# Patient Record
Sex: MALE | Race: OTHER
[De-identification: names, ages, dates, MRNs, and addresses within clinical notes are randomized per-mention and may not be internally consistent; named-entity substitution may affect disease eponyms.]

---

## 2018-08-19 ENCOUNTER — TRANSCRIPTION ENCOUNTER (OUTPATIENT)
Age: 33
End: 2018-08-19

## 2018-08-20 ENCOUNTER — INPATIENT (INPATIENT)
Facility: HOSPITAL | Age: 33
LOS: 8 days | Discharge: HOME | DRG: 489 | End: 2018-08-29
Attending: ORTHOPAEDIC SURGERY | Admitting: ORTHOPAEDIC SURGERY
Payer: COMMERCIAL

## 2018-08-20 VITALS
WEIGHT: 160.06 LBS | HEIGHT: 66 IN | SYSTOLIC BLOOD PRESSURE: 128 MMHG | TEMPERATURE: 98 F | DIASTOLIC BLOOD PRESSURE: 82 MMHG | HEART RATE: 65 BPM | RESPIRATION RATE: 18 BRPM | OXYGEN SATURATION: 96 %

## 2018-08-20 DIAGNOSIS — M00.9 PYOGENIC ARTHRITIS, UNSPECIFIED: ICD-10-CM

## 2018-08-20 LAB
ANION GAP SERPL CALC-SCNC: 14 MMOL/L — SIGNIFICANT CHANGE UP (ref 5–17)
APTT BLD: 31.3 SEC — SIGNIFICANT CHANGE UP (ref 27.5–37.4)
B PERT IGG+IGM PNL SER: ABNORMAL
BLD GP AB SCN SERPL QL: NEGATIVE — SIGNIFICANT CHANGE UP
BUN SERPL-MCNC: 17 MG/DL — SIGNIFICANT CHANGE UP (ref 7–23)
CALCIUM SERPL-MCNC: 9.6 MG/DL — SIGNIFICANT CHANGE UP (ref 8.4–10.5)
CHLORIDE SERPL-SCNC: 100 MMOL/L — SIGNIFICANT CHANGE UP (ref 96–108)
CO2 SERPL-SCNC: 21 MMOL/L — LOW (ref 22–31)
COLOR FLD: YELLOW — SIGNIFICANT CHANGE UP
CREAT SERPL-MCNC: 0.93 MG/DL — SIGNIFICANT CHANGE UP (ref 0.5–1.3)
EOSINOPHIL # FLD: 8 % — SIGNIFICANT CHANGE UP
FLUID INTAKE SUBSTANCE CLASS: SIGNIFICANT CHANGE UP
FLUID SEGMENTED GRANULOCYTES: 79 % — SIGNIFICANT CHANGE UP
GLUCOSE FLD-MCNC: <6 MG/DL — SIGNIFICANT CHANGE UP
GLUCOSE SERPL-MCNC: 122 MG/DL — HIGH (ref 70–99)
GRAM STN FLD: SIGNIFICANT CHANGE UP
HCT VFR BLD CALC: 44.4 % — SIGNIFICANT CHANGE UP (ref 39–50)
HGB BLD-MCNC: 15.8 G/DL — SIGNIFICANT CHANGE UP (ref 13–17)
INR BLD: 1 RATIO — SIGNIFICANT CHANGE UP (ref 0.88–1.16)
LYMPHOCYTES # FLD: 2 % — SIGNIFICANT CHANGE UP
MCHC RBC-ENTMCNC: 30.4 PG — SIGNIFICANT CHANGE UP (ref 27–34)
MCHC RBC-ENTMCNC: 35.6 GM/DL — SIGNIFICANT CHANGE UP (ref 32–36)
MCV RBC AUTO: 85.5 FL — SIGNIFICANT CHANGE UP (ref 80–100)
MONOS+MACROS # FLD: 11 % — SIGNIFICANT CHANGE UP
PLATELET # BLD AUTO: 174 K/UL — SIGNIFICANT CHANGE UP (ref 150–400)
POTASSIUM SERPL-MCNC: 3.9 MMOL/L — SIGNIFICANT CHANGE UP (ref 3.5–5.3)
POTASSIUM SERPL-SCNC: 3.9 MMOL/L — SIGNIFICANT CHANGE UP (ref 3.5–5.3)
PROT FLD-MCNC: 4.5 G/DL — SIGNIFICANT CHANGE UP
PROTHROM AB SERPL-ACNC: 10.8 SEC — SIGNIFICANT CHANGE UP (ref 9.8–12.7)
RBC # BLD: 5.2 M/UL — SIGNIFICANT CHANGE UP (ref 4.2–5.8)
RBC # FLD: 11.8 % — SIGNIFICANT CHANGE UP (ref 10.3–14.5)
RCV VOL RI: 50 /UL — HIGH (ref 0–5)
RH IG SCN BLD-IMP: POSITIVE — SIGNIFICANT CHANGE UP
RH IG SCN BLD-IMP: POSITIVE — SIGNIFICANT CHANGE UP
SODIUM SERPL-SCNC: 135 MMOL/L — SIGNIFICANT CHANGE UP (ref 135–145)
SPECIMEN SOURCE: SIGNIFICANT CHANGE UP
TOTAL NUCLEATED CELL COUNT, BODY FLUID: HIGH /UL (ref 0–5)
TUBE TYPE: SIGNIFICANT CHANGE UP
WBC # BLD: 13.1 K/UL — HIGH (ref 3.8–10.5)
WBC # FLD AUTO: 13.1 K/UL — HIGH (ref 3.8–10.5)

## 2018-08-20 PROCEDURE — 99285 EMERGENCY DEPT VISIT HI MDM: CPT | Mod: 25

## 2018-08-20 PROCEDURE — 20610 DRAIN/INJ JOINT/BURSA W/O US: CPT

## 2018-08-20 PROCEDURE — 27310 EXPLORATION OF KNEE JOINT: CPT | Mod: RT

## 2018-08-20 PROCEDURE — 71045 X-RAY EXAM CHEST 1 VIEW: CPT | Mod: 26

## 2018-08-20 PROCEDURE — 73562 X-RAY EXAM OF KNEE 3: CPT | Mod: 26,RT

## 2018-08-20 RX ORDER — OXYCODONE HYDROCHLORIDE 5 MG/1
10 TABLET ORAL
Qty: 0 | Refills: 0 | Status: DISCONTINUED | OUTPATIENT
Start: 2018-08-20 | End: 2018-08-21

## 2018-08-20 RX ORDER — MORPHINE SULFATE 50 MG/1
2 CAPSULE, EXTENDED RELEASE ORAL EVERY 4 HOURS
Qty: 0 | Refills: 0 | Status: DISCONTINUED | OUTPATIENT
Start: 2018-08-20 | End: 2018-08-21

## 2018-08-20 RX ORDER — DOCUSATE SODIUM 100 MG
100 CAPSULE ORAL THREE TIMES A DAY
Qty: 0 | Refills: 0 | Status: DISCONTINUED | OUTPATIENT
Start: 2018-08-20 | End: 2018-08-29

## 2018-08-20 RX ORDER — ONDANSETRON 8 MG/1
4 TABLET, FILM COATED ORAL ONCE
Qty: 0 | Refills: 0 | Status: DISCONTINUED | OUTPATIENT
Start: 2018-08-20 | End: 2018-08-20

## 2018-08-20 RX ORDER — TRAMADOL HYDROCHLORIDE 50 MG/1
50 TABLET ORAL EVERY 6 HOURS
Qty: 0 | Refills: 0 | Status: DISCONTINUED | OUTPATIENT
Start: 2018-08-20 | End: 2018-08-27

## 2018-08-20 RX ORDER — CEFAZOLIN SODIUM 1 G
1000 VIAL (EA) INJECTION ONCE
Qty: 0 | Refills: 0 | Status: COMPLETED | OUTPATIENT
Start: 2018-08-20 | End: 2018-08-20

## 2018-08-20 RX ORDER — SODIUM CHLORIDE 9 MG/ML
1000 INJECTION, SOLUTION INTRAVENOUS
Qty: 0 | Refills: 0 | Status: DISCONTINUED | OUTPATIENT
Start: 2018-08-20 | End: 2018-08-20

## 2018-08-20 RX ORDER — ONDANSETRON 8 MG/1
4 TABLET, FILM COATED ORAL EVERY 6 HOURS
Qty: 0 | Refills: 0 | Status: DISCONTINUED | OUTPATIENT
Start: 2018-08-20 | End: 2018-08-29

## 2018-08-20 RX ORDER — VANCOMYCIN HCL 1 G
1000 VIAL (EA) INTRAVENOUS DAILY
Qty: 0 | Refills: 0 | Status: DISCONTINUED | OUTPATIENT
Start: 2018-08-21 | End: 2018-08-21

## 2018-08-20 RX ORDER — OXYCODONE HYDROCHLORIDE 5 MG/1
10 TABLET ORAL EVERY 4 HOURS
Qty: 0 | Refills: 0 | Status: DISCONTINUED | OUTPATIENT
Start: 2018-08-20 | End: 2018-08-20

## 2018-08-20 RX ORDER — PANTOPRAZOLE SODIUM 20 MG/1
40 TABLET, DELAYED RELEASE ORAL DAILY
Qty: 0 | Refills: 0 | Status: DISCONTINUED | OUTPATIENT
Start: 2018-08-20 | End: 2018-08-29

## 2018-08-20 RX ORDER — ACETAMINOPHEN 500 MG
975 TABLET ORAL ONCE
Qty: 0 | Refills: 0 | Status: COMPLETED | OUTPATIENT
Start: 2018-08-20 | End: 2018-08-20

## 2018-08-20 RX ORDER — OXYCODONE HYDROCHLORIDE 5 MG/1
5 TABLET ORAL EVERY 4 HOURS
Qty: 0 | Refills: 0 | Status: DISCONTINUED | OUTPATIENT
Start: 2018-08-20 | End: 2018-08-20

## 2018-08-20 RX ORDER — TRAMADOL HYDROCHLORIDE 50 MG/1
50 TABLET ORAL EVERY 8 HOURS
Qty: 0 | Refills: 0 | Status: DISCONTINUED | OUTPATIENT
Start: 2018-08-20 | End: 2018-08-20

## 2018-08-20 RX ORDER — HYDROMORPHONE HYDROCHLORIDE 2 MG/ML
0.5 INJECTION INTRAMUSCULAR; INTRAVENOUS; SUBCUTANEOUS
Qty: 0 | Refills: 0 | Status: DISCONTINUED | OUTPATIENT
Start: 2018-08-20 | End: 2018-08-20

## 2018-08-20 RX ORDER — OXYCODONE HYDROCHLORIDE 5 MG/1
5 TABLET ORAL
Qty: 0 | Refills: 0 | Status: DISCONTINUED | OUTPATIENT
Start: 2018-08-20 | End: 2018-08-21

## 2018-08-20 RX ORDER — ASPIRIN/CALCIUM CARB/MAGNESIUM 324 MG
325 TABLET ORAL DAILY
Qty: 0 | Refills: 0 | Status: DISCONTINUED | OUTPATIENT
Start: 2018-08-20 | End: 2018-08-29

## 2018-08-20 RX ORDER — ONDANSETRON 8 MG/1
4 TABLET, FILM COATED ORAL EVERY 6 HOURS
Qty: 0 | Refills: 0 | Status: DISCONTINUED | OUTPATIENT
Start: 2018-08-20 | End: 2018-08-20

## 2018-08-20 RX ORDER — ACETAMINOPHEN 500 MG
1000 TABLET ORAL EVERY 8 HOURS
Qty: 0 | Refills: 0 | Status: DISCONTINUED | OUTPATIENT
Start: 2018-08-20 | End: 2018-08-29

## 2018-08-20 RX ORDER — SENNA PLUS 8.6 MG/1
2 TABLET ORAL AT BEDTIME
Qty: 0 | Refills: 0 | Status: DISCONTINUED | OUTPATIENT
Start: 2018-08-20 | End: 2018-08-29

## 2018-08-20 RX ORDER — POLYETHYLENE GLYCOL 3350 17 G/17G
17 POWDER, FOR SOLUTION ORAL DAILY
Qty: 0 | Refills: 0 | Status: DISCONTINUED | OUTPATIENT
Start: 2018-08-20 | End: 2018-08-29

## 2018-08-20 RX ORDER — MAGNESIUM HYDROXIDE 400 MG/1
30 TABLET, CHEWABLE ORAL DAILY
Qty: 0 | Refills: 0 | Status: DISCONTINUED | OUTPATIENT
Start: 2018-08-20 | End: 2018-08-29

## 2018-08-20 RX ORDER — MORPHINE SULFATE 50 MG/1
2 CAPSULE, EXTENDED RELEASE ORAL EVERY 4 HOURS
Qty: 0 | Refills: 0 | Status: DISCONTINUED | OUTPATIENT
Start: 2018-08-20 | End: 2018-08-20

## 2018-08-20 RX ORDER — KETOROLAC TROMETHAMINE 30 MG/ML
15 SYRINGE (ML) INJECTION EVERY 6 HOURS
Qty: 0 | Refills: 0 | Status: DISCONTINUED | OUTPATIENT
Start: 2018-08-20 | End: 2018-08-21

## 2018-08-20 RX ORDER — DOCUSATE SODIUM 100 MG
100 CAPSULE ORAL THREE TIMES A DAY
Qty: 0 | Refills: 0 | Status: DISCONTINUED | OUTPATIENT
Start: 2018-08-20 | End: 2018-08-20

## 2018-08-20 RX ORDER — ACETAMINOPHEN 500 MG
1000 TABLET ORAL EVERY 8 HOURS
Qty: 0 | Refills: 0 | Status: COMPLETED | OUTPATIENT
Start: 2018-08-20 | End: 2018-08-20

## 2018-08-20 RX ORDER — ACETAMINOPHEN 500 MG
650 TABLET ORAL EVERY 6 HOURS
Qty: 0 | Refills: 0 | Status: DISCONTINUED | OUTPATIENT
Start: 2018-08-20 | End: 2018-08-20

## 2018-08-20 RX ORDER — ACETAMINOPHEN 500 MG
650 TABLET ORAL EVERY 6 HOURS
Qty: 0 | Refills: 0 | Status: DISCONTINUED | OUTPATIENT
Start: 2018-08-20 | End: 2018-08-29

## 2018-08-20 RX ORDER — SODIUM CHLORIDE 9 MG/ML
1000 INJECTION, SOLUTION INTRAVENOUS
Qty: 0 | Refills: 0 | Status: DISCONTINUED | OUTPATIENT
Start: 2018-08-20 | End: 2018-08-29

## 2018-08-20 RX ADMIN — OXYCODONE HYDROCHLORIDE 5 MILLIGRAM(S): 5 TABLET ORAL at 16:31

## 2018-08-20 RX ADMIN — HYDROMORPHONE HYDROCHLORIDE 0.5 MILLIGRAM(S): 2 INJECTION INTRAMUSCULAR; INTRAVENOUS; SUBCUTANEOUS at 21:45

## 2018-08-20 RX ADMIN — OXYCODONE HYDROCHLORIDE 5 MILLIGRAM(S): 5 TABLET ORAL at 15:31

## 2018-08-20 RX ADMIN — HYDROMORPHONE HYDROCHLORIDE 0.5 MILLIGRAM(S): 2 INJECTION INTRAMUSCULAR; INTRAVENOUS; SUBCUTANEOUS at 22:40

## 2018-08-20 RX ADMIN — HYDROMORPHONE HYDROCHLORIDE 0.5 MILLIGRAM(S): 2 INJECTION INTRAMUSCULAR; INTRAVENOUS; SUBCUTANEOUS at 22:25

## 2018-08-20 RX ADMIN — Medication 975 MILLIGRAM(S): at 06:09

## 2018-08-20 RX ADMIN — Medication 15 MILLIGRAM(S): at 22:58

## 2018-08-20 RX ADMIN — SODIUM CHLORIDE 100 MILLILITER(S): 9 INJECTION, SOLUTION INTRAVENOUS at 22:58

## 2018-08-20 RX ADMIN — Medication 100 MILLIGRAM(S): at 07:59

## 2018-08-20 RX ADMIN — Medication 400 MILLIGRAM(S): at 23:00

## 2018-08-20 RX ADMIN — Medication 15 MILLIGRAM(S): at 23:17

## 2018-08-20 RX ADMIN — Medication 1000 MILLIGRAM(S): at 23:20

## 2018-08-20 RX ADMIN — Medication 1 TABLET(S): at 15:31

## 2018-08-20 RX ADMIN — HYDROMORPHONE HYDROCHLORIDE 0.5 MILLIGRAM(S): 2 INJECTION INTRAMUSCULAR; INTRAVENOUS; SUBCUTANEOUS at 22:10

## 2018-08-20 RX ADMIN — HYDROMORPHONE HYDROCHLORIDE 0.5 MILLIGRAM(S): 2 INJECTION INTRAMUSCULAR; INTRAVENOUS; SUBCUTANEOUS at 22:00

## 2018-08-20 RX ADMIN — Medication 975 MILLIGRAM(S): at 08:00

## 2018-08-20 NOTE — ED PROVIDER NOTE - NS ED ROS FT
Constitutional: no fever  Eyes: no conjunctivitis  Ears: no ear pain   Nose: no nasal congestion, Mouth/Throat: no throat pain, Neck: no stiffness  Cardiovascular: no chest pain  Chest: no cough  Gastrointestinal: no abdominal pain, no vomiting and diarrhea  MSK: + joint pain  : no dysuria  Skin: no rash  Neuro: no LOC

## 2018-08-20 NOTE — ED PROVIDER NOTE - PHYSICAL EXAMINATION
right leg: tenderness over superior anterior aspect of right knee with significant swelling appreciated, minimal range of motion secondary to pain, no erythema, dp pulse palpable, sensation and motor function intact

## 2018-08-20 NOTE — ED ADULT NURSE REASSESSMENT NOTE - NS ED NURSE REASSESS COMMENT FT1
Report received from NETTIE JAMES in red. Patient appears to be resting comfortably in stretcher. Patient denies dizziness, n/v/d, numbness, tingling, SOB, pain. A&OX3. Safety and comfort measures provided.

## 2018-08-20 NOTE — ED PROVIDER NOTE - ATTENDING CONTRIBUTION TO CARE
32y M no signif PMH here with c/o atraumatic R knee pain and swelling. Pt denies trauma or injury, States works as . Does not spend signif amount of time on hands and knees. States that this evening around 11PM noted his R knee was swollen and painful to touch mostly in suprapatellar region. Pain is worse with knee flexion, palpation and ambulation. Swelling has increased since onset. There is no redness or fever. No hx of DM.   On exam: Pt is well appearing in NAD, Periph pulses intact, sensation intact, no rashes or lesions, R knee swelling suprapatellar region and medial infrapatellar region.   AP: 32y M no signif PMH here with c/o atraumatic R knee pain and swelling. Xray. Do not suspect septic joint. More likely bursitis. Will tap joint for pain control. Ace wrap, analgesia, FU sports clinic. 32y M no signif PMH here with c/o atraumatic R knee pain and swelling. Pt denies trauma or injury, States works as . Does not spend signif amount of time on hands and knees. States that this evening around 11PM noted his R knee was swollen and painful to touch mostly in suprapatellar region. Pain is worse with knee flexion, palpation and ambulation. Swelling has increased since onset. There is no redness or fever. No hx of DM.   On exam: Pt is well appearing in NAD, Periph pulses intact, sensation intact, no rashes or lesions, R knee swelling suprapatellar region and medial infrapatellar region. R knee Slightly warm to touch.   AP: 32y M no signif PMH here with c/o atraumatic R knee pain and swelling. Xray. Do not suspect septic joint. More likely bursitis. Will tap joint for pain control and send fluid. 32y M no signif PMH here with c/o atraumatic R knee pain and swelling. Pt denies trauma or injury, States works as . Does not spend signif amount of time on hands and knees. States that this evening around 11PM noted his R knee was swollen and painful to touch mostly in suprapatellar region. Pain is worse with knee flexion, palpation and ambulation. Swelling has increased since onset. There is no redness or fever. No hx of DM.   On exam: Pt is well appearing in NAD, Periph pulses intact, sensation intact, R knee swelling suprapatellar region and medial infrapatellar region. R knee Slightly warm to touch. Dec R knee ROM 2/2 pain and swelling. Skin is intact without abrasions, rashes or lesions. No erythema to R knee.   AP: 32y M no signif PMH here with c/o atraumatic R knee pain and swelling. Xray. Do not suspect septic joint, more likely bursitis. Will tap joint for pain control and send fluid.

## 2018-08-20 NOTE — H&P ADULT - HISTORY OF PRESENT ILLNESS
32M with R knee pain/swelling. Pt states he woke up last night with R knee pain/swelling. Denies any recent falls or trauma to area. Denies subjective fevers/chills, denies numbness/tingling. Pt states pain progressively worsened to the point he was unable to bear weight on RLE. ED staff aspirated R knee and state about 60cc of cloudy fluid was drained. Pt denies any history of gout/pseudogout and has not had any orthopedic surgeries in the past. No other orthopedic complaints. Denies any recent travel or illnesses.

## 2018-08-20 NOTE — H&P ADULT - ASSESSMENT
32M with R septic knee  Admit to ortho  Pain control  NWB RLE  NPO  IVF while NPO  FU BCx and aspiration cultures  Hold antibiotics until OR this afternoon  Plan for OR this afternoon with Dr. Ladd for I&D R knee  Discussed with attending

## 2018-08-20 NOTE — H&P ADULT - NSHPREVIEWOFSYSTEMS_GEN_ALL_CORE
CONSTITUTIONAL: No fever or chills  HEENT:  No headache, no sore throat  RESPIRATORY: No cough, wheezing, or shortness of breath  CARDIOVASCULAR: No chest pain, palpitations, or leg swelling  GASTROINTESTINAL: No nausea, vomiting, or diarrhea  GENITOURINARY: No dysuria, frequency, or hematuria  NEUROLOGICAL: no focal weakness or dizziness  SKIN:  No rashes or lesions   MUSCULOSKELETAL: no myalgias, +R knee pain/swelling  PSYCHIATRIC: No depression or anxiety

## 2018-08-20 NOTE — H&P ADULT - ATTENDING COMMENTS
R septic knee with >70,000, 79% PMNs. Plan for urgent I+D. All RBAs discussed. All questions answered. Informed consent obtained.

## 2018-08-20 NOTE — CHART NOTE - NSCHARTNOTEFT_GEN_A_CORE
C/o dsg "too tight around the knee". Denies chest pain, SOB, N/V.    T(C): 36.2 (08-20-18 @ 21:20)  T(F): 97.2 (08-20-18 @ 21:20)  HR: 66 (08-20-18 @ 22:30)  BP: 149/85 (08-20-18 @ 22:15)  RR: 18 (08-20-18 @ 22:10)  SpO2: 100% (08-20-18 @ 22:30)      Exam:  Alert and oriented; No acute distress    R lower extremity:  Knee dsg CDI; re-wrapped ace  HMV intact with good suction   Calf soft, non-tender  +PF/DF/EHL/FHL  Sensation grossly intact                            15.8   13.1  )-----------( 174      ( 20 Aug 2018 06:26 )             44.4        135  |  100  |  17  ----------------------------<  122<H>  3.9   |  21<L>  |  0.93      A/P: 32y Male s/p R knee I&D; Stable    -Pain control, ice prn, elevate  -DVT ppx; IS  -Am labs  -PT eval-WBAT RLE  -Continue current tx.    Evangelina Clement PA-C  Orthopedic Surgery  Pagers [phone]

## 2018-08-20 NOTE — ED ADULT NURSE NOTE - OBJECTIVE STATEMENT
31 yo presents to the ED from home. A&Ox4 c/o R knee swelling and pain. pt reports pain started int he middle of the night and it has worsened since then. pt denies any trauma to site recently. pt denies fever, chills. pt denies similar episode in the past. pt reports that 1 year ago he had wood fall onto the same knee but did not experience much pain at that time. pt reports taking Motrin 3 hours ago for pain control but only experienced minimal relief. pt denies numbness, tingling. pt reports being able to ambulate but with pain. pt denies medical history. pt is well appearing, VSS. family at bedside.

## 2018-08-20 NOTE — ED PROVIDER NOTE - PROGRESS NOTE DETAILS
Dr. Felton: Fluid was cloudy. Sent for eval. Will cover w ABx. rob - pt endorsed to me at signout pending arthrocentesis--ortho awAre-- arthrocentesis shows >70,000 wbc - Dr. Felton: Joint fluid was cloudy. Sent for eval. Will cover w ABx. Ortho contacted.

## 2018-08-20 NOTE — H&P ADULT - NSHPPHYSICALEXAM_GEN_ALL_CORE
Gen: NAD, AAOx3  RLE: Skin intact, +mild warmth R knee, no erythema, minimal effusion (evaluated after ED aspiration), +EHL/FHL/TA/GS, SILT, +dp pulse intact, compartments soft/compressible, AROM knee 0-90 degrees with pain, no varus/valgus instability noted

## 2018-08-20 NOTE — BRIEF OPERATIVE NOTE - PROCEDURE
<<-----Click on this checkbox to enter Procedure Irrigation and debridement of knee  08/20/2018  Right, open  Active  AVJOB

## 2018-08-20 NOTE — ED PROVIDER NOTE - OBJECTIVE STATEMENT
31 yo male presenting with right knee pain that started at 11 pm progressively getting worse, worse with movement, somewhat improved with rest.  took ibuprofen for his symptoms with minimal relief.  pain radiates down leg.  no recent trauma.  pain 9/10 in severity with movement.  has never happened in the past.  no fevers.

## 2018-08-21 LAB
ANION GAP SERPL CALC-SCNC: 10 MMOL/L — SIGNIFICANT CHANGE UP (ref 5–17)
B BURGDOR C6 AB SER-ACNC: NEGATIVE — SIGNIFICANT CHANGE UP
B BURGDOR IGG+IGM SER-ACNC: 0.31 INDEX — SIGNIFICANT CHANGE UP (ref 0.01–0.89)
BUN SERPL-MCNC: 10 MG/DL — SIGNIFICANT CHANGE UP (ref 7–23)
CALCIUM SERPL-MCNC: 8.4 MG/DL — SIGNIFICANT CHANGE UP (ref 8.4–10.5)
CHLORIDE SERPL-SCNC: 102 MMOL/L — SIGNIFICANT CHANGE UP (ref 96–108)
CO2 SERPL-SCNC: 27 MMOL/L — SIGNIFICANT CHANGE UP (ref 22–31)
CREAT SERPL-MCNC: 1.02 MG/DL — SIGNIFICANT CHANGE UP (ref 0.5–1.3)
ERYTHROCYTE [SEDIMENTATION RATE] IN BLOOD: 18 MM/HR — HIGH (ref 0–15)
GLUCOSE SERPL-MCNC: 128 MG/DL — HIGH (ref 70–99)
GRAM STN FLD: SIGNIFICANT CHANGE UP
HCT VFR BLD CALC: 39.1 % — SIGNIFICANT CHANGE UP (ref 39–50)
HGB BLD-MCNC: 13.2 G/DL — SIGNIFICANT CHANGE UP (ref 13–17)
LYME C6 AB IGG/IGM EIA REFLEX WESTERN BL: SIGNIFICANT CHANGE UP
MCHC RBC-ENTMCNC: 28.9 PG — SIGNIFICANT CHANGE UP (ref 27–34)
MCHC RBC-ENTMCNC: 33.8 GM/DL — SIGNIFICANT CHANGE UP (ref 32–36)
MCV RBC AUTO: 85.7 FL — SIGNIFICANT CHANGE UP (ref 80–100)
PLATELET # BLD AUTO: 174 K/UL — SIGNIFICANT CHANGE UP (ref 150–400)
POTASSIUM SERPL-MCNC: 3.9 MMOL/L — SIGNIFICANT CHANGE UP (ref 3.5–5.3)
POTASSIUM SERPL-SCNC: 3.9 MMOL/L — SIGNIFICANT CHANGE UP (ref 3.5–5.3)
RBC # BLD: 4.56 M/UL — SIGNIFICANT CHANGE UP (ref 4.2–5.8)
RBC # FLD: 13.8 % — SIGNIFICANT CHANGE UP (ref 10.3–14.5)
SODIUM SERPL-SCNC: 139 MMOL/L — SIGNIFICANT CHANGE UP (ref 135–145)
SPECIMEN SOURCE: SIGNIFICANT CHANGE UP
WBC # BLD: 7.08 K/UL — SIGNIFICANT CHANGE UP (ref 3.8–10.5)
WBC # FLD AUTO: 7.08 K/UL — SIGNIFICANT CHANGE UP (ref 3.8–10.5)

## 2018-08-21 RX ORDER — ACETAMINOPHEN 500 MG
1000 TABLET ORAL EVERY 8 HOURS
Qty: 0 | Refills: 0 | Status: COMPLETED | OUTPATIENT
Start: 2018-08-21 | End: 2018-08-21

## 2018-08-21 RX ORDER — CEFTRIAXONE 500 MG/1
2 INJECTION, POWDER, FOR SOLUTION INTRAMUSCULAR; INTRAVENOUS EVERY 24 HOURS
Qty: 0 | Refills: 0 | Status: DISCONTINUED | OUTPATIENT
Start: 2018-08-21 | End: 2018-08-29

## 2018-08-21 RX ORDER — VANCOMYCIN HCL 1 G
1000 VIAL (EA) INTRAVENOUS EVERY 12 HOURS
Qty: 0 | Refills: 0 | Status: DISCONTINUED | OUTPATIENT
Start: 2018-08-21 | End: 2018-08-23

## 2018-08-21 RX ORDER — HYDROMORPHONE HYDROCHLORIDE 2 MG/ML
2 INJECTION INTRAMUSCULAR; INTRAVENOUS; SUBCUTANEOUS
Qty: 0 | Refills: 0 | Status: DISCONTINUED | OUTPATIENT
Start: 2018-08-21 | End: 2018-08-23

## 2018-08-21 RX ORDER — HYDROMORPHONE HYDROCHLORIDE 2 MG/ML
4 INJECTION INTRAMUSCULAR; INTRAVENOUS; SUBCUTANEOUS
Qty: 0 | Refills: 0 | Status: DISCONTINUED | OUTPATIENT
Start: 2018-08-21 | End: 2018-08-22

## 2018-08-21 RX ADMIN — Medication 100 MILLIGRAM(S): at 14:56

## 2018-08-21 RX ADMIN — POLYETHYLENE GLYCOL 3350 17 GRAM(S): 17 POWDER, FOR SOLUTION ORAL at 11:35

## 2018-08-21 RX ADMIN — OXYCODONE HYDROCHLORIDE 10 MILLIGRAM(S): 5 TABLET ORAL at 06:34

## 2018-08-21 RX ADMIN — Medication 1000 MILLIGRAM(S): at 09:12

## 2018-08-21 RX ADMIN — OXYCODONE HYDROCHLORIDE 10 MILLIGRAM(S): 5 TABLET ORAL at 06:04

## 2018-08-21 RX ADMIN — Medication 400 MILLIGRAM(S): at 09:52

## 2018-08-21 RX ADMIN — OXYCODONE HYDROCHLORIDE 10 MILLIGRAM(S): 5 TABLET ORAL at 00:37

## 2018-08-21 RX ADMIN — HYDROMORPHONE HYDROCHLORIDE 4 MILLIGRAM(S): 2 INJECTION INTRAMUSCULAR; INTRAVENOUS; SUBCUTANEOUS at 19:20

## 2018-08-21 RX ADMIN — HYDROMORPHONE HYDROCHLORIDE 4 MILLIGRAM(S): 2 INJECTION INTRAMUSCULAR; INTRAVENOUS; SUBCUTANEOUS at 23:31

## 2018-08-21 RX ADMIN — HYDROMORPHONE HYDROCHLORIDE 4 MILLIGRAM(S): 2 INJECTION INTRAMUSCULAR; INTRAVENOUS; SUBCUTANEOUS at 12:51

## 2018-08-21 RX ADMIN — HYDROMORPHONE HYDROCHLORIDE 4 MILLIGRAM(S): 2 INJECTION INTRAMUSCULAR; INTRAVENOUS; SUBCUTANEOUS at 18:51

## 2018-08-21 RX ADMIN — Medication 15 MILLIGRAM(S): at 14:56

## 2018-08-21 RX ADMIN — MORPHINE SULFATE 2 MILLIGRAM(S): 50 CAPSULE, EXTENDED RELEASE ORAL at 03:00

## 2018-08-21 RX ADMIN — PANTOPRAZOLE SODIUM 40 MILLIGRAM(S): 20 TABLET, DELAYED RELEASE ORAL at 11:35

## 2018-08-21 RX ADMIN — HYDROMORPHONE HYDROCHLORIDE 4 MILLIGRAM(S): 2 INJECTION INTRAMUSCULAR; INTRAVENOUS; SUBCUTANEOUS at 13:20

## 2018-08-21 RX ADMIN — MORPHINE SULFATE 2 MILLIGRAM(S): 50 CAPSULE, EXTENDED RELEASE ORAL at 08:33

## 2018-08-21 RX ADMIN — Medication 100 MILLIGRAM(S): at 21:14

## 2018-08-21 RX ADMIN — MORPHINE SULFATE 2 MILLIGRAM(S): 50 CAPSULE, EXTENDED RELEASE ORAL at 02:45

## 2018-08-21 RX ADMIN — Medication 325 MILLIGRAM(S): at 11:35

## 2018-08-21 RX ADMIN — Medication 15 MILLIGRAM(S): at 15:11

## 2018-08-21 RX ADMIN — Medication 250 MILLIGRAM(S): at 11:35

## 2018-08-21 RX ADMIN — CEFTRIAXONE 100 GRAM(S): 500 INJECTION, POWDER, FOR SOLUTION INTRAMUSCULAR; INTRAVENOUS at 18:12

## 2018-08-21 RX ADMIN — Medication 250 MILLIGRAM(S): at 22:04

## 2018-08-21 RX ADMIN — OXYCODONE HYDROCHLORIDE 10 MILLIGRAM(S): 5 TABLET ORAL at 00:07

## 2018-08-21 RX ADMIN — MORPHINE SULFATE 2 MILLIGRAM(S): 50 CAPSULE, EXTENDED RELEASE ORAL at 08:18

## 2018-08-21 NOTE — CONSULT NOTE ADULT - SUBJECTIVE AND OBJECTIVE BOX
HPI:   Patient is a 32y male with no past medical history and has been perfectly well until 2 nights ago when his right knee began to hurt and when he woke in the morning it was very swollen. He came here, had tap with 75K wbc, mostly pmn, had washout and we are called. He has no fever, no h/o std exposure known, no penile discharge, no recent illness or dental procedure, no diarrhea, no known tick bite but he is a  on [place]. He never had any other episodes of joint swelling. He has no rashes, no ill contact , no recent travel. From [facility] in  for 14 years. Monogamous with wife.     REVIEW OF SYSTEMS:  All other review of systems negative (Comprehensive ROS)    PAST MEDICAL & SURGICAL HISTORY:  No pertinent past medical history  No significant past surgical history      Allergies    No Known Allergies    Intolerances        Antimicrobials Day #  :1  vancomycin  IVPB 1000 milliGRAM(s) IV Intermittent daily    Other Medications:  acetaminophen   Tablet 650 milliGRAM(s) Oral every 6 hours PRN  acetaminophen  IVPB. 1000 milliGRAM(s) IV Intermittent every 8 hours PRN  aluminum hydroxide/magnesium hydroxide/simethicone Suspension 30 milliLiter(s) Oral four times a day PRN  aspirin enteric coated 325 milliGRAM(s) Oral daily  docusate sodium 100 milliGRAM(s) Oral three times a day  HYDROmorphone   Tablet 2 milliGRAM(s) Oral every 3 hours PRN  HYDROmorphone   Tablet 4 milliGRAM(s) Oral every 3 hours PRN  ketorolac   Injectable 15 milliGRAM(s) IV Push every 6 hours PRN  lactated ringers. 1000 milliLiter(s) IV Continuous <Continuous>  magnesium hydroxide Suspension 30 milliLiter(s) Oral daily PRN  morphine  - Injectable 2 milliGRAM(s) IV Push every 4 hours PRN  ondansetron Injectable 4 milliGRAM(s) IV Push every 6 hours PRN  pantoprazole    Tablet 40 milliGRAM(s) Oral daily  polyethylene glycol 3350 17 Gram(s) Oral daily  senna 2 Tablet(s) Oral at bedtime PRN  traMADol 50 milliGRAM(s) Oral every 6 hours PRN      FAMILY HISTORY:  No pertinent family history in first degree relatives      SOCIAL HISTORY:  Smoking: [ ]Yes [ x]No  ETOH: [ ]Yes [x ]No  Drug Use: [ ]Yes [x ]No   [ x] Single[ ]    T(F): 98.6 (08-21-18 @ 08:30), Max: 98.6 (08-21-18 @ 08:30)  HR: 72 (08-21-18 @ 08:30)  BP: 137/79 (08-21-18 @ 08:30)  RR: 18 (08-21-18 @ 08:30)  SpO2: 97% (08-21-18 @ 08:30)  Wt(kg): --    PHYSICAL EXAM:  General: alert, no acute distress  Eyes:  anicteric, no conjunctival injection, no discharge  Oropharynx: no lesions or injection 	  Neck: supple, without adenopathy  Lungs: clear to auscultation  Heart: regular rate and rhythm; S1S2 2/6 sys m  Abdomen: soft, nondistended, nontender, without mass or organomegaly  Skin: no lesions  Extremities: no clubbing, cyanosis, or edema  Neurologic: alert, oriented, moves all extremities    LAB RESULTS:                        13.2   7.08  )-----------( 174      ( 21 Aug 2018 08:36 )             39.1     08-21    139  |  102  |  10  ----------------------------<  128<H>  3.9   |  27  |  1.02    Ca    8.4      21 Aug 2018 06:17            MICROBIOLOGY:  RECENT CULTURES:  08-20 @ 08:23 .Body Fluid Synovial Fluid     No growth    Numerous polymorphonuclear leukocytes seen per low power field  No organisms seen per oil power field          RADIOLOGY REVIEWED:    < from: Xray Knee 3 Views, Right (08.20.18 @ 06:37) >  IMPRESSION:    No fracture or dislocation of the right knee. Small suprapatellar joint   effusion.      < end of copied text >    Impression:  Patient with presumed septic right knee for no apparent reason now s/p washout. No known bacteremia episode or reason for it, no symptoms of gc, must r/o lyme but fluid seems a bit purulent.     Recommendations:  cover with vanco and ceftriaxone  await cultures  check gc chlamydia urine  check lyme titers  may need rheum eval to r/o reactive arthritis but seems abrupt and no antecedent illness

## 2018-08-21 NOTE — PROGRESS NOTE ADULT - SUBJECTIVE AND OBJECTIVE BOX
Ortho Progress Note    S: Patient seen and examined. No acute events overnight, transferred to floor from PACU. Pain well controlled with current regimen. Denies lightheadedness/dizziness, CP/SOB. Tolerating diet.       O:  Physical Exam:  Gen: Laying in bed, NAD, alert and oriented.   Resp: Unlabored breathing  Ext: EHL/FHL/TA/Sol intact          + SILT DP/SP/GROVER/Sa          +DP, extremity WWP  Bulky Garza dressing in place  Hemovac in place, draining serosang fluid    Hemovac output 60cc / shift    Vital Signs Last 24 Hrs  T(C): 36.4 (21 Aug 2018 02:40), Max: 36.9 (20 Aug 2018 07:35)  T(F): 97.6 (21 Aug 2018 02:40), Max: 98.5 (20 Aug 2018 07:35)  HR: 64 (21 Aug 2018 02:40) (59 - 86)  BP: 127/70 (21 Aug 2018 02:40) (117/71 - 156/92)  BP(mean): 112 (20 Aug 2018 23:15) (104 - 113)  RR: 18 (21 Aug 2018 02:40) (16 - 20)  SpO2: 98% (21 Aug 2018 02:40) (97% - 100%)                          15.8   13.1  )-----------( 174      ( 20 Aug 2018 06:26 )             44.4       08-20    135  |  100  |  17  ----------------------------<  122<H>  3.9   |  21<L>  |  0.93        PT/INR - ( 20 Aug 2018 06:26 )   PT: 10.8 sec;   INR: 1.00 ratio         PTT - ( 20 Aug 2018 06:26 )  PTT:31.3 sec      A/P  32y Male w/ septic R knee s/p open knee washout POD 1.     -f/u AM labs  -Vancomycin  -ID consult for further Abx management  -bulky garza dressing change q 2 days  -hemovac until draining <20cc / q8hr shift  -f/u OR and Aspiration cultures  -WBAT  -DVT Ppx:  daily  -Diet: Reg  -PT

## 2018-08-21 NOTE — PHYSICAL THERAPY INITIAL EVALUATION ADULT - ACTIVE RANGE OF MOTION EXAMINATION, REHAB EVAL
Left LE Active ROM was WFL (within functional limits)/RLE limited 2/2 pain/bilateral upper extremity Active ROM was WFL (within functional limits)

## 2018-08-21 NOTE — PHYSICAL THERAPY INITIAL EVALUATION ADULT - RANGE OF MOTION EXAMINATION, REHAB EVAL
RLE limited 2/2 pain/bilateral upper extremity ROM was WFL (within functional limits)/Left LE ROM was WFL (within functional limits)

## 2018-08-21 NOTE — PHYSICAL THERAPY INITIAL EVALUATION ADULT - ADDITIONAL COMMENTS
Prior to admission pt was independent in all ADL's and ambulation without an AD. Pt works as a . Lives with wife and children in apartment with 1 step to enter, Rail on R going up. Then elevator access.

## 2018-08-22 LAB
C TRACH RRNA SPEC QL NAA+PROBE: SIGNIFICANT CHANGE UP
N GONORRHOEA RRNA SPEC QL NAA+PROBE: SIGNIFICANT CHANGE UP
SPECIMEN SOURCE: SIGNIFICANT CHANGE UP
VANCOMYCIN TROUGH SERPL-MCNC: 4.2 UG/ML — LOW (ref 10–20)

## 2018-08-22 RX ADMIN — HYDROMORPHONE HYDROCHLORIDE 4 MILLIGRAM(S): 2 INJECTION INTRAMUSCULAR; INTRAVENOUS; SUBCUTANEOUS at 17:50

## 2018-08-22 RX ADMIN — PANTOPRAZOLE SODIUM 40 MILLIGRAM(S): 20 TABLET, DELAYED RELEASE ORAL at 13:18

## 2018-08-22 RX ADMIN — HYDROMORPHONE HYDROCHLORIDE 4 MILLIGRAM(S): 2 INJECTION INTRAMUSCULAR; INTRAVENOUS; SUBCUTANEOUS at 13:18

## 2018-08-22 RX ADMIN — HYDROMORPHONE HYDROCHLORIDE 4 MILLIGRAM(S): 2 INJECTION INTRAMUSCULAR; INTRAVENOUS; SUBCUTANEOUS at 03:38

## 2018-08-22 RX ADMIN — HYDROMORPHONE HYDROCHLORIDE 4 MILLIGRAM(S): 2 INJECTION INTRAMUSCULAR; INTRAVENOUS; SUBCUTANEOUS at 03:18

## 2018-08-22 RX ADMIN — CEFTRIAXONE 100 GRAM(S): 500 INJECTION, POWDER, FOR SOLUTION INTRAMUSCULAR; INTRAVENOUS at 17:51

## 2018-08-22 RX ADMIN — Medication 325 MILLIGRAM(S): at 13:18

## 2018-08-22 RX ADMIN — HYDROMORPHONE HYDROCHLORIDE 4 MILLIGRAM(S): 2 INJECTION INTRAMUSCULAR; INTRAVENOUS; SUBCUTANEOUS at 18:20

## 2018-08-22 RX ADMIN — HYDROMORPHONE HYDROCHLORIDE 4 MILLIGRAM(S): 2 INJECTION INTRAMUSCULAR; INTRAVENOUS; SUBCUTANEOUS at 00:00

## 2018-08-22 RX ADMIN — POLYETHYLENE GLYCOL 3350 17 GRAM(S): 17 POWDER, FOR SOLUTION ORAL at 13:18

## 2018-08-22 RX ADMIN — HYDROMORPHONE HYDROCHLORIDE 4 MILLIGRAM(S): 2 INJECTION INTRAMUSCULAR; INTRAVENOUS; SUBCUTANEOUS at 13:45

## 2018-08-22 RX ADMIN — HYDROMORPHONE HYDROCHLORIDE 4 MILLIGRAM(S): 2 INJECTION INTRAMUSCULAR; INTRAVENOUS; SUBCUTANEOUS at 08:32

## 2018-08-22 RX ADMIN — Medication 100 MILLIGRAM(S): at 13:18

## 2018-08-22 RX ADMIN — Medication 250 MILLIGRAM(S): at 23:46

## 2018-08-22 RX ADMIN — HYDROMORPHONE HYDROCHLORIDE 4 MILLIGRAM(S): 2 INJECTION INTRAMUSCULAR; INTRAVENOUS; SUBCUTANEOUS at 09:02

## 2018-08-22 RX ADMIN — Medication 250 MILLIGRAM(S): at 13:18

## 2018-08-22 NOTE — PROGRESS NOTE ADULT - SUBJECTIVE AND OBJECTIVE BOX
CC: f/u for  septic right knee  Patient reports knee drains out, feels ok    REVIEW OF SYSTEMS:  All other review of systems negative (Comprehensive ROS)    Antimicrobials Day #  :pod 2  cefTRIAXone   IVPB 2 Gram(s) IV Intermittent every 24 hours  vancomycin  IVPB 1000 milliGRAM(s) IV Intermittent every 12 hours    Other Medications Reviewed    T(F): 97.9 (08-22-18 @ 17:04), Max: 99 (08-22-18 @ 00:48)  HR: 82 (08-22-18 @ 17:04)  BP: 151/82 (08-22-18 @ 17:04)  RR: 18 (08-22-18 @ 17:04)  SpO2: 98% (08-22-18 @ 17:04)  Wt(kg): --    PHYSICAL EXAM:  General: alert, no acute distress  Eyes:  anicteric, no conjunctival injection, no discharge  Oropharynx: no lesions or injection 	  Neck: supple, without adenopathy  Lungs: clear to auscultation  Heart: regular rate and rhythm, 2/6 sys m  Abdomen: soft, nondistended, nontender, without mass or organomegaly  Skin: no lesions  Extremities: no clubbing, cyanosis, or edema. knee wound clean  Neurologic: alert, oriented, moves all extremities    LAB RESULTS:                        13.2   7.08  )-----------( 174      ( 21 Aug 2018 08:36 )             39.1     08-21    139  |  102  |  10  ----------------------------<  128<H>  3.9   |  27  |  1.02    Ca    8.4      21 Aug 2018 06:17          MICROBIOLOGY:  RECENT CULTURES:  08-21 @ 07:36 .Tissue Other, right knee synovium       Moderate polymorphonuclear leukocytes seen per low power field  Numerous Red blood cells seen per low power field  No organisms seen per oil power field    08-21 @ 07:29 .Surgical Swab right knee synovial fluid #3     No growth      08-20 @ 13:29 .Blood Blood     No growth to date.      08-20 @ 08:23 .Body Fluid Synovial Fluid     No growth    Numerous polymorphonuclear leukocytes seen per low power field  No organisms seen per oil power field      Crystals, Synovial Fluid (08.20.18 @ 08:15)    Crystals, Synovial Fluid:   CONTAINER: _sterile cup    COLOR: yellow   Ref Range: Colorless    CLARITY: cloudy   Ref Range: Clear    RESULT: negative for crystals  Ref Range: None Seen      RADIOLOGY REVIEWED:              Assessment:  Patient with sudden onset right knee pain and swelling s/p washout for presumed septic knee. So far cultures negative, lyme neg, gc chlamydia neg, , no crystals  Plan:  continue vanco and ceftriaxone  follow up cultures  check vanco trough  echo

## 2018-08-22 NOTE — PROGRESS NOTE ADULT - SUBJECTIVE AND OBJECTIVE BOX
Ortho Progress Note    S: Patient seen and examined. No acute events overnight. Knee pain improved. Denies lightheadedness/dizziness, CP/SOB. Tolerating diet.   Patient has been ambulating. Dressings changed today.      O:  Physical Exam:  Gen: Laying in bed, NAD, alert and oriented.   Resp: Unlabored breathing  Ext: EHL/FHL/TA/Sol intact          + SILT DP/SP/GROVER/Sa          +DP, extremity WWP  Dressings changed, incision C/D/I.  Hemovac in place, draining serosang fluid    Hemovac output 10cc / 12 hr shift    Vitals 24hrs  Vital Signs Last 24 Hrs  T(C): 36.7 (22 Aug 2018 04:37), Max: 37.2 (21 Aug 2018 21:51)  T(F): 98.1 (22 Aug 2018 04:37), Max: 99 (22 Aug 2018 00:48)  HR: 74 (22 Aug 2018 04:37) (62 - 76)  BP: 157/82 (22 Aug 2018 04:37) (119/61 - 157/82)  BP(mean): --  RR: 18 (22 Aug 2018 04:37) (18 - 18)  SpO2: 97% (22 Aug 2018 04:37) (95% - 98%)      08-20-18 @ 07:01  -  08-21-18 @ 07:00  --------------------------------------------------------  IN: 1200 mL / OUT: 860 mL / NET: 340 mL    08-21-18 @ 07:01  -  08-22-18 @ 06:34  --------------------------------------------------------  IN: 1240 mL / OUT: 2075 mL / NET: -835 mL      Lab Results 24hrs:                        13.2   7.08  )-----------( 174      ( 21 Aug 2018 08:36 )             39.1     08-21    139  |  102  |  10  ----------------------------<  128<H>  3.9   |  27  |  1.02    Ca    8.4      21 Aug 2018 06:17        A/P  32y Male w/ septic R knee s/p open knee washout POD 2.    -f/u ID recs  -Vancomycin and Ceftriaxone until Or Cx returned (as per ID)  -dressing change q 2 days  -hemovac until draining <20cc / q8hr shift  -f/u OR Cx (NGTD) and Aspiration cultures (NGTD)  -f/u blood Cx (NGTD)  -f/u GC and Lyme  -WBAT  -PT  -DVT Ppx:  daily  -Diet: Reg Ortho Progress Note    S: Patient seen and examined. No acute events overnight. Knee pain improved. Denies lightheadedness/dizziness, CP/SOB. Tolerating diet.   Patient has been ambulating. Dressings changed today.      O:  Physical Exam:  Gen: Laying in bed, NAD, alert and oriented.   Resp: Unlabored breathing  Ext: EHL/FHL/TA/Sol intact          + SILT DP/SP/GROVER/Sa          +DP, extremity WWP  Dressings changed, incision C/D/I.  Hemovac in place, draining serosang fluid    Hemovac output 10cc / 12 hr shift    Vitals 24hrs  Vital Signs Last 24 Hrs  T(C): 36.7 (22 Aug 2018 04:37), Max: 37.2 (21 Aug 2018 21:51)  T(F): 98.1 (22 Aug 2018 04:37), Max: 99 (22 Aug 2018 00:48)  HR: 74 (22 Aug 2018 04:37) (62 - 76)  BP: 157/82 (22 Aug 2018 04:37) (119/61 - 157/82)  BP(mean): --  RR: 18 (22 Aug 2018 04:37) (18 - 18)  SpO2: 97% (22 Aug 2018 04:37) (95% - 98%)      08-20-18 @ 07:01  -  08-21-18 @ 07:00  --------------------------------------------------------  IN: 1200 mL / OUT: 860 mL / NET: 340 mL    08-21-18 @ 07:01  -  08-22-18 @ 06:34  --------------------------------------------------------  IN: 1240 mL / OUT: 2075 mL / NET: -835 mL      Lab Results 24hrs:                        13.2   7.08  )-----------( 174      ( 21 Aug 2018 08:36 )             39.1     08-21    139  |  102  |  10  ----------------------------<  128<H>  3.9   |  27  |  1.02    Ca    8.4      21 Aug 2018 06:17        A/P  32y Male w/ septic R knee s/p open knee washout POD 2.    -f/u ID recs  -Vancomycin and Ceftriaxone until Or Cx returned (as per ID)  -dressing change q 2 days  -hemovac until draining <20cc / q8hr shift  -f/u OR Cx (NGTD) and Aspiration cultures (NGTD)  -f/u blood Cx (NGTD)  -f/u GC and Lyme  -WBAT  -DVT Ppx:  daily  -Diet: Reg  -Dispo: Home with home PT

## 2018-08-23 PROCEDURE — 73723 MRI JOINT LWR EXTR W/O&W/DYE: CPT | Mod: 26,RT

## 2018-08-23 PROCEDURE — 93306 TTE W/DOPPLER COMPLETE: CPT | Mod: 26

## 2018-08-23 RX ORDER — VANCOMYCIN HCL 1 G
1250 VIAL (EA) INTRAVENOUS EVERY 8 HOURS
Qty: 0 | Refills: 0 | Status: DISCONTINUED | OUTPATIENT
Start: 2018-08-23 | End: 2018-08-28

## 2018-08-23 RX ADMIN — Medication 166.67 MILLIGRAM(S): at 21:45

## 2018-08-23 RX ADMIN — Medication 325 MILLIGRAM(S): at 13:12

## 2018-08-23 RX ADMIN — HYDROMORPHONE HYDROCHLORIDE 2 MILLIGRAM(S): 2 INJECTION INTRAMUSCULAR; INTRAVENOUS; SUBCUTANEOUS at 16:34

## 2018-08-23 RX ADMIN — Medication 166.67 MILLIGRAM(S): at 06:36

## 2018-08-23 RX ADMIN — CEFTRIAXONE 100 GRAM(S): 500 INJECTION, POWDER, FOR SOLUTION INTRAMUSCULAR; INTRAVENOUS at 17:51

## 2018-08-23 RX ADMIN — HYDROMORPHONE HYDROCHLORIDE 2 MILLIGRAM(S): 2 INJECTION INTRAMUSCULAR; INTRAVENOUS; SUBCUTANEOUS at 17:00

## 2018-08-23 RX ADMIN — PANTOPRAZOLE SODIUM 40 MILLIGRAM(S): 20 TABLET, DELAYED RELEASE ORAL at 13:12

## 2018-08-23 RX ADMIN — Medication 166.67 MILLIGRAM(S): at 13:12

## 2018-08-23 NOTE — PROGRESS NOTE ADULT - SUBJECTIVE AND OBJECTIVE BOX
CC: f/u for  septic right knee  Patient reports  he feels ok  REVIEW OF SYSTEMS:  All other review of systems negative (Comprehensive ROS)    Antimicrobials Day #  :3  cefTRIAXone   IVPB 2 Gram(s) IV Intermittent every 24 hours  vancomycin  IVPB 1250 milliGRAM(s) IV Intermittent every 8 hours    Other Medications Reviewed    T(F): 98.2 (08-23-18 @ 09:06), Max: 98.2 (08-23-18 @ 09:06)  HR: 75 (08-23-18 @ 09:06)  BP: 123/75 (08-23-18 @ 09:06)  RR: 18 (08-23-18 @ 09:06)  SpO2: 98% (08-23-18 @ 09:06)  Wt(kg): --    PHYSICAL EXAM:  General: alert, no acute distress  Eyes:  anicteric, no conjunctival injection, no discharge  Oropharynx: no lesions or injection 	  Neck: supple, without adenopathy  Lungs: clear to auscultation  Heart: regular rate and rhythm; no murmur, rubs or gallops  Abdomen: soft, nondistended, nontender, without mass or organomegaly  Skin: no lesions  Extremities: right knee wound is clean, mild swelling  Neurologic: alert, oriented, moves all extremities    LAB RESULTS:      Borrelia burgdorferi IgG/IgM Antibodies (08.21.18 @ 14:22)    LYME IgG/IgM Antibodies Result: 0.31 Index    Lyme C6 Interpretation: Negative: METHOD: JESSICA      Reference Range: (values expressed as Lyme Index )                                < 0.90        Negative                                0.90 - 1.09   Equivocal                                >= 1.10        Positive      CDC/ASTPHLDGuidelines recommend that all samples judged equivocal or      positive be re-tested by immunoblot for confirmation of results.        Chlamydia/GC Nucleic Acid Amplification (08.21.18 @ 14:21)    Source Amp: Urine: Testing on female urine has not been approved by the US Food and Drug  Administration (FDA). Performance characteristics of this assay for  testing of female urine have been determined by [employer]. The clinical significance of positive results should be  considered in conjunction with the overall clinical presentation of the  patient. Result is not intended to be used as the sole means for clinical  diagnosis or patient management decisions.    Chlamydia Amplification Result: NotDetec: This assay screens for the presence of Chlamydia trachomatis rRNA using  transcription mediated amplification with the [employer] System.  A "Not Detected" result does not preclude the possibility of an infection  with C. trachomatis. If resultsare indeterminate, please submit a new  specimen.  This assay is not intended for the evaluation of suspected sexual abuse  or for other medico-legal reasons. The performance of this test has not  been evaluated in women <16 years of age    GC Amplification Result: NotDetec: This assay screens for the presence of Neisseria gonorrhoeae rRNA using  transcription mediated amplification with the [employer] System.  A Not Detected result does not preclude the possibility of an infection  with N. gonorrhoeae. If results are indeterminate, please submit a new  specimen.  This assay is not intended for the evaluation of suspected sexual abuse  or for other medico-legal reasons. The performance of this test has not  been evaluated in women <16 years of age.        MICROBIOLOGY:  RECENT CULTURES:  08-21 @ 07:36 .Tissue Other, right knee synovium     No growth    Moderate polymorphonuclear leukocytes seen per low power field  Numerous Red blood cells seen per low power field  No organisms seen per oil power field    08-21 @ 07:29 .Surgical Swab right knee synovial fluid #3     No growth      08-20 @ 13:29 .Blood Blood     No growth to date.      08-20 @ 08:23 .Body Fluid Synovial Fluid     No growth    Numerous polymorphonuclear leukocytes seen per low power field  No organisms seen per oil power field        RADIOLOGY REVIEWED:  < from: Xray Knee 3 Views, Right (08.20.18 @ 06:37) >    IMPRESSION:    No fracture or dislocation of the right knee. Small suprapatellar joint   effusion.      < end of copied text >    Assessment:  Patient with acute onset right knee pain and swelling, tap with high wbc and pmn predominant but some mono too. S/P washout, neg cultures to date and neg crystals. Not clear what infection is present and maybe he has a reactive arthritis though no apparent antecedent illness  Plan: continue vanco and ceftriaxone  await cultures  rheum eval and mri knee  r/w Dr. Jacobs CC: f/u for  septic right knee  Patient reports  he feels ok  REVIEW OF SYSTEMS:  All other review of systems negative (Comprehensive ROS)    Antimicrobials Day #  :3  cefTRIAXone   IVPB 2 Gram(s) IV Intermittent every 24 hours  vancomycin  IVPB 1250 milliGRAM(s) IV Intermittent every 8 hours    Other Medications Reviewed    T(F): 98.2 (08-23-18 @ 09:06), Max: 98.2 (08-23-18 @ 09:06)  HR: 75 (08-23-18 @ 09:06)  BP: 123/75 (08-23-18 @ 09:06)  RR: 18 (08-23-18 @ 09:06)  SpO2: 98% (08-23-18 @ 09:06)  Wt(kg): --    PHYSICAL EXAM:  General: alert, no acute distress  Eyes:  anicteric, no conjunctival injection, no discharge  Oropharynx: no lesions or injection 	  Neck: supple, without adenopathy  Lungs: clear to auscultation  Heart: regular rate and rhythm; no murmur, rubs or gallops  Abdomen: soft, nondistended, nontender, without mass or organomegaly  Skin: no lesions  Extremities: right knee wound is clean, mild swelling  Neurologic: alert, oriented, moves all extremities    LAB RESULTS:      Borrelia burgdorferi IgG/IgM Antibodies (08.21.18 @ 14:22)    LYME IgG/IgM Antibodies Result: 0.31 Index    Lyme C6 Interpretation: Negative: METHOD: JESSICA      Reference Range: (values expressed as Lyme Index )                                < 0.90        Negative                                0.90 - 1.09   Equivocal                                >= 1.10        Positive      CDC/ASTPHLDGuidelines recommend that all samples judged equivocal or      positive be re-tested by immunoblot for confirmation of results.        Chlamydia/GC Nucleic Acid Amplification (08.21.18 @ 14:21)    Source Amp: Urine: Testing on female urine has not been approved by the US Food and Drug  Administration (FDA). Performance characteristics of this assay for  testing of female urine have been determined by [employer]. The clinical significance of positive results should be  considered in conjunction with the overall clinical presentation of the  patient. Result is not intended to be used as the sole means for clinical  diagnosis or patient management decisions.    Chlamydia Amplification Result: NotDetec: This assay screens for the presence of Chlamydia trachomatis rRNA using  transcription mediated amplification with the [employer] System.  A "Not Detected" result does not preclude the possibility of an infection  with C. trachomatis. If resultsare indeterminate, please submit a new  specimen.  This assay is not intended for the evaluation of suspected sexual abuse  or for other medico-legal reasons. The performance of this test has not  been evaluated in women <16 years of age    GC Amplification Result: NotDetec: This assay screens for the presence of Neisseria gonorrhoeae rRNA using  transcription mediated amplification with the [employer] System.  A Not Detected result does not preclude the possibility of an infection  with N. gonorrhoeae. If results are indeterminate, please submit a new  specimen.  This assay is not intended for the evaluation of suspected sexual abuse  or for other medico-legal reasons. The performance of this test has not  been evaluated in women <16 years of age.        MICROBIOLOGY:  RECENT CULTURES:  08-21 @ 07:36 .Tissue Other, right knee synovium     No growth    Moderate polymorphonuclear leukocytes seen per low power field  Numerous Red blood cells seen per low power field  No organisms seen per oil power field    08-21 @ 07:29 .Surgical Swab right knee synovial fluid #3     No growth      08-20 @ 13:29 .Blood Blood     No growth to date.      08-20 @ 08:23 .Body Fluid Synovial Fluid     No growth    Numerous polymorphonuclear leukocytes seen per low power field  No organisms seen per oil power field        RADIOLOGY REVIEWED:  < from: Xray Knee 3 Views, Right (08.20.18 @ 06:37) >    IMPRESSION:    No fracture or dislocation of the right knee. Small suprapatellar joint   effusion.      < end of copied text >    Assessment:  Patient with acute onset right knee pain and swelling, tap with high wbc and pmn predominant but some mono too. S/P washout, neg cultures to date and neg crystals. Not clear what infection is present and maybe he has a reactive arthritis though no apparent antecedent illness  Plan: continue vanco and ceftriaxone  await cultures  rheum eval and mri knee  await echo  r/w Dr. Jacobs

## 2018-08-23 NOTE — PROGRESS NOTE ADULT - SUBJECTIVE AND OBJECTIVE BOX
Patient is a 32y old  Male who presents with a chief complaint of Right knee pain/swelling  Patient s/p I&D right septic knee  POST OPERATIVE DAY #:  [3 ]   Patient comfortable  No complaints    T(C): 36.7 (08-23-18 @ 05:19), Max: 36.7 (08-22-18 @ 10:30)  HR: 73 (08-23-18 @ 05:19) (69 - 82)  BP: 121/72 (08-23-18 @ 05:19) (115/71 - 151/82)  RR: 18 (08-23-18 @ 05:19) (18 - 18)  SpO2: 97% (08-23-18 @ 05:19) (97% - 98%)  Wt(kg): --    PHYSICAL EXAM:  NAD, Alert  [Right ] Knee: Dressing C/D/I; sensation grossly intact to light touch; (+) DF/PF; (+) Distal Pulses; No Calf tenderness B/L, PAS     LABS:                     13.2   7.08  )-----------( 174      ( 21 Aug 2018 08:36 )             39.1

## 2018-08-23 NOTE — PROGRESS NOTE ADULT - ASSESSMENT
31 y/o M s/p I&D right knee POD#3 f/u ID recs  Jessica Pressley PA-C  Orthopaedic Surgery  Team pager [phone]  [facility] [phone]  [phone]

## 2018-08-24 LAB
ALBUMIN SERPL ELPH-MCNC: 4.1 G/DL — SIGNIFICANT CHANGE UP (ref 3.3–5)
ALP SERPL-CCNC: 112 U/L — SIGNIFICANT CHANGE UP (ref 40–120)
ALT FLD-CCNC: 55 U/L — HIGH (ref 10–45)
ANION GAP SERPL CALC-SCNC: 14 MMOL/L — SIGNIFICANT CHANGE UP (ref 5–17)
AST SERPL-CCNC: 33 U/L — SIGNIFICANT CHANGE UP (ref 10–40)
BASOPHILS # BLD AUTO: 0.03 K/UL — SIGNIFICANT CHANGE UP (ref 0–0.2)
BASOPHILS NFR BLD AUTO: 0.5 % — SIGNIFICANT CHANGE UP (ref 0–2)
BILIRUB SERPL-MCNC: 0.5 MG/DL — SIGNIFICANT CHANGE UP (ref 0.2–1.2)
BUN SERPL-MCNC: 9 MG/DL — SIGNIFICANT CHANGE UP (ref 7–23)
CALCIUM SERPL-MCNC: 9.6 MG/DL — SIGNIFICANT CHANGE UP (ref 8.4–10.5)
CHLORIDE SERPL-SCNC: 96 MMOL/L — SIGNIFICANT CHANGE UP (ref 96–108)
CO2 SERPL-SCNC: 24 MMOL/L — SIGNIFICANT CHANGE UP (ref 22–31)
CREAT SERPL-MCNC: 0.85 MG/DL — SIGNIFICANT CHANGE UP (ref 0.5–1.3)
EOSINOPHIL # BLD AUTO: 0.17 K/UL — SIGNIFICANT CHANGE UP (ref 0–0.5)
EOSINOPHIL NFR BLD AUTO: 2.8 % — SIGNIFICANT CHANGE UP (ref 0–6)
GLUCOSE SERPL-MCNC: 111 MG/DL — HIGH (ref 70–99)
HCT VFR BLD CALC: 43.1 % — SIGNIFICANT CHANGE UP (ref 39–50)
HGB BLD-MCNC: 15.1 G/DL — SIGNIFICANT CHANGE UP (ref 13–17)
IMM GRANULOCYTES NFR BLD AUTO: 0.2 % — SIGNIFICANT CHANGE UP (ref 0–1.5)
LYMPHOCYTES # BLD AUTO: 1.99 K/UL — SIGNIFICANT CHANGE UP (ref 1–3.3)
LYMPHOCYTES # BLD AUTO: 32.4 % — SIGNIFICANT CHANGE UP (ref 13–44)
MCHC RBC-ENTMCNC: 30.4 PG — SIGNIFICANT CHANGE UP (ref 27–34)
MCHC RBC-ENTMCNC: 35 GM/DL — SIGNIFICANT CHANGE UP (ref 32–36)
MCV RBC AUTO: 86.9 FL — SIGNIFICANT CHANGE UP (ref 80–100)
MONOCYTES # BLD AUTO: 0.64 K/UL — SIGNIFICANT CHANGE UP (ref 0–0.9)
MONOCYTES NFR BLD AUTO: 10.4 % — SIGNIFICANT CHANGE UP (ref 2–14)
NEUTROPHILS # BLD AUTO: 3.3 K/UL — SIGNIFICANT CHANGE UP (ref 1.8–7.4)
NEUTROPHILS NFR BLD AUTO: 53.7 % — SIGNIFICANT CHANGE UP (ref 43–77)
PLATELET # BLD AUTO: 225 K/UL — SIGNIFICANT CHANGE UP (ref 150–400)
POTASSIUM SERPL-MCNC: 3.7 MMOL/L — SIGNIFICANT CHANGE UP (ref 3.5–5.3)
POTASSIUM SERPL-SCNC: 3.7 MMOL/L — SIGNIFICANT CHANGE UP (ref 3.5–5.3)
PROT SERPL-MCNC: 7.7 G/DL — SIGNIFICANT CHANGE UP (ref 6–8.3)
RBC # BLD: 4.96 M/UL — SIGNIFICANT CHANGE UP (ref 4.2–5.8)
RBC # FLD: 13 % — SIGNIFICANT CHANGE UP (ref 10.3–14.5)
SODIUM SERPL-SCNC: 134 MMOL/L — LOW (ref 135–145)
VANCOMYCIN TROUGH SERPL-MCNC: 7.6 UG/ML — LOW (ref 10–20)
WBC # BLD: 6.14 K/UL — SIGNIFICANT CHANGE UP (ref 3.8–10.5)
WBC # FLD AUTO: 6.14 K/UL — SIGNIFICANT CHANGE UP (ref 3.8–10.5)

## 2018-08-24 RX ADMIN — Medication 166.67 MILLIGRAM(S): at 14:40

## 2018-08-24 RX ADMIN — PANTOPRAZOLE SODIUM 40 MILLIGRAM(S): 20 TABLET, DELAYED RELEASE ORAL at 11:49

## 2018-08-24 RX ADMIN — Medication 166.67 MILLIGRAM(S): at 22:36

## 2018-08-24 RX ADMIN — Medication 325 MILLIGRAM(S): at 11:49

## 2018-08-24 RX ADMIN — Medication 166.67 MILLIGRAM(S): at 08:01

## 2018-08-24 RX ADMIN — Medication 100 MILLIGRAM(S): at 14:40

## 2018-08-24 RX ADMIN — CEFTRIAXONE 100 GRAM(S): 500 INJECTION, POWDER, FOR SOLUTION INTRAMUSCULAR; INTRAVENOUS at 21:21

## 2018-08-24 NOTE — PROGRESS NOTE ADULT - SUBJECTIVE AND OBJECTIVE BOX
Patient seen at bedside. States knee pain is well controlled. Is able to ambulate but has minimal ROM. Dressings were changed, drain was removed yesterday. Denies any F/V/N/V/SOB.    Vitals 24hrs  Vital Signs Last 24 Hrs  T(C): 36.3 (24 Aug 2018 05:44), Max: 36.8 (23 Aug 2018 09:06)  T(F): 97.4 (24 Aug 2018 05:44), Max: 98.3 (23 Aug 2018 22:46)  HR: 72 (24 Aug 2018 05:44) (72 - 81)  BP: 118/66 (24 Aug 2018 05:44) (118/66 - 130/81)  BP(mean): --  RR: 18 (24 Aug 2018 05:44) (18 - 18)  SpO2: 99% (24 Aug 2018 05:44) (96% - 99%)      08-22-18 @ 07:01  -  08-23-18 @ 07:00  --------------------------------------------------------  IN: 2250 mL / OUT: 0 mL / NET: 2250 mL    08-23-18 @ 07:01  -  08-24-18 @ 06:41  --------------------------------------------------------  IN: 1080 mL / OUT: 0 mL / NET: 1080 mL      Lab Results 24hrs:  Negative Lyme and GC.    LIANG negative.      PHYSICAL EXAM:  NAD, Alert  [Right ] Knee: incision C/D/I; erythematous, effusion. No drainage on dressing. ROM: Flexion limited to 10 degrees due to pain.  sensation grossly intact to light touch; (+) DF/PF; (+) Distal Pulses; No Calf tenderness B/L, PAS

## 2018-08-24 NOTE — PROGRESS NOTE ADULT - SUBJECTIVE AND OBJECTIVE BOX
CC: f/u for acute Rt knee arthritis    Patient reports: improved right knee pain    REVIEW OF SYSTEMS:  All other review of systems negative (Comprehensive ROS)    Antimicrobials Day #  :day 4  cefTRIAXone   IVPB 2 Gram(s) IV Intermittent every 24 hours  vancomycin  IVPB 1250 milliGRAM(s) IV Intermittent every 8 hours    Other Medications Reviewed    T(F): 97.9 (08-24-18 @ 11:50), Max: 98.3 (08-23-18 @ 22:46)  HR: 98 (08-24-18 @ 11:50)  BP: 143/86 (08-24-18 @ 11:50)  RR: 18 (08-24-18 @ 11:50)  SpO2: 99% (08-24-18 @ 11:50)  Wt(kg): --    PHYSICAL EXAM:  General: alert, no acute distress  Eyes:  anicteric, no conjunctival injection, no discharge  Oropharynx: no lesions or injection 	  Neck: supple, without adenopathy  Lungs: clear to auscultation  Heart: regular rate and rhythm; no murmur, rubs or gallops  Abdomen: soft, nondistended, nontender, without mass or organomegaly  Skin: no lesions  Extremities: no clubbing, cyanosis, or edema.Rt knee is wrapped in ACE dressing  Neurologic: alert, oriented, moves all extremities    LAB RESULTS:                        15.1   6.14  )-----------( 225      ( 24 Aug 2018 08:05 )             43.1     08-24    134<L>  |  96  |  9   ----------------------------<  111<H>  3.7   |  24  |  0.85    Ca    9.6      24 Aug 2018 07:11    TPro  7.7  /  Alb  4.1  /  TBili  0.5  /  DBili  x   /  AST  33  /  ALT  55<H>  /  AlkPhos  112  08-24    LIVER FUNCTIONS - ( 24 Aug 2018 07:11 )  Alb: 4.1 g/dL / Pro: 7.7 g/dL / ALK PHOS: 112 U/L / ALT: 55 U/L / AST: 33 U/L / GGT: x             MICROBIOLOGY:  RECENT CULTURES:  08-21 @ 07:36 .Tissue Other, right knee synovium     No growth    Moderate polymorphonuclear leukocytes seen per low power field  Numerous Red blood cells seen per low power field  No organisms seen per oil power field    08-21 @ 07:29 .Surgical Swab right knee synovial fluid #3     No growth      08-20 @ 13:29 .Blood Blood     No growth to date.      08-20 @ 08:23 .Body Fluid Synovial Fluid     No growth    Numerous polymorphonuclear leukocytes seen per low power field  No organisms seen per oil power field        RADIOLOGY REVIEWED:    < from: MR Knee w/wo IV Cont, Right (08.23.18 @ 21:43) >  IMPRESSION:   1.  Postsurgical changes of the quadriceps tendon with surrounding soft   tissue swelling and edema that may reflect postsurgical changes.   Superimposed infection is not excluded.  2.  Moderate mildly complex knee joint effusion with synovial   enhancement. The setting of recent surgery, differential includes   postsurgical changes and synovitis of infectious or inflammatory etiology.  3.  Nonspecific distal vastus lateralis and vastus medialis edema and   enhancement, likely compatible with postsurgical change. Differential   includes myositis of infectious or inflammatory etiology.  4.  Nondisplaced posterior horn tear in the medial meniscus  5.  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    < end of copied text >

## 2018-08-24 NOTE — PROGRESS NOTE ADULT - ASSESSMENT
31 y/o M s/p I&D right knee POD#4     -f/u ID recs  -c/w Vancomycin, Ceftriaxone  -MRI RLE complete, f/u final read  -f/u Rheum  -dressing change q2 days  -f/u ORCx, Asp Cx, Blood Cx: all NGTD 31 y/o M s/p I&D right knee POD#4     -f/u ID recs  -c/w Vancomycin, Ceftriaxone  -Prior Vanc trough subtherapeutic dose increased to 1250mg. F/u next vanc trough.  -MRI RLE complete, f/u final read  -f/u Rheum  -dressing change q2 days  -f/u ORCx, Asp Cx, Blood Cx: all NGTD

## 2018-08-25 LAB
CULTURE RESULTS: SIGNIFICANT CHANGE UP
SPECIMEN SOURCE: SIGNIFICANT CHANGE UP
VANCOMYCIN TROUGH SERPL-MCNC: 12.3 UG/ML — SIGNIFICANT CHANGE UP (ref 10–20)

## 2018-08-25 RX ADMIN — Medication 650 MILLIGRAM(S): at 10:47

## 2018-08-25 RX ADMIN — PANTOPRAZOLE SODIUM 40 MILLIGRAM(S): 20 TABLET, DELAYED RELEASE ORAL at 14:19

## 2018-08-25 RX ADMIN — Medication 325 MILLIGRAM(S): at 14:19

## 2018-08-25 RX ADMIN — Medication 100 MILLIGRAM(S): at 14:18

## 2018-08-25 RX ADMIN — Medication 166.67 MILLIGRAM(S): at 07:45

## 2018-08-25 RX ADMIN — Medication 166.67 MILLIGRAM(S): at 16:01

## 2018-08-25 RX ADMIN — Medication 166.67 MILLIGRAM(S): at 21:56

## 2018-08-25 RX ADMIN — CEFTRIAXONE 100 GRAM(S): 500 INJECTION, POWDER, FOR SOLUTION INTRAMUSCULAR; INTRAVENOUS at 20:01

## 2018-08-25 NOTE — PROGRESS NOTE ADULT - SUBJECTIVE AND OBJECTIVE BOX
ORTHO  Patient is a 32y old  Male who presents with a chief complaint of Right knee pain/swelling (20 Aug 2018 11:14)    Pt. resting without complaint    VS-  T(C): 36.7 (08-25-18 @ 05:01), Max: 37 (08-24-18 @ 16:54)  HR: 60 (08-25-18 @ 05:01) (60 - 98)  BP: 122/73 (08-25-18 @ 05:01) (115/71 - 143/86)  RR: 18 (08-25-18 @ 05:01) (18 - 18)  SpO2: 98% (08-25-18 @ 05:01) (97% - 99%)  Wt(kg): --    M.S. A&O  Extremity- R LE- bulky dressing in place  Neuro-              Motor- (+)ankle DF/PF              Sensation- grossly intact to light touch              Calves- soft, nontender                               15.1   6.14  )-----------( 225      ( 24 Aug 2018 08:05 )             43.1     08-24    134<L>  |  96  |  9   ----------------------------<  111<H>  3.7   |  24  |  0.85    Ca    9.6      24 Aug 2018 07:11    TPro  7.7  /  Alb  4.1  /  TBili  0.5  /  DBili  x   /  AST  33  /  ALT  55<H>  /  AlkPhos  112  08-24

## 2018-08-25 NOTE — PROGRESS NOTE ADULT - ASSESSMENT
Impression: Stable       Plan:   Continue present treatment                 Out of bed, ambulate                  Continue to monitor                  ID note appreciated    Liang Urrutia PA-C  Orthopaedic Surgery  Team pager [phone]  [phone]

## 2018-08-25 NOTE — PROGRESS NOTE ADULT - SUBJECTIVE AND OBJECTIVE BOX
CC: f/u for  presumed septic right knee  Patient reports  he has a rash where ace wrap sits  REVIEW OF SYSTEMS:  All other review of systems negative (Comprehensive ROS)    Antimicrobials Day #  :5  cefTRIAXone   IVPB 2 Gram(s) IV Intermittent every 24 hours  vancomycin  IVPB 1250 milliGRAM(s) IV Intermittent every 8 hours    Other Medications Reviewed    T(F): 98.1 (08-25-18 @ 16:53), Max: 98.1 (08-25-18 @ 00:19)  HR: 69 (08-25-18 @ 16:53)  BP: 107/77 (08-25-18 @ 16:53)  RR: 18 (08-25-18 @ 16:53)  SpO2: 100% (08-25-18 @ 16:53)  Wt(kg): --    PHYSICAL EXAM:  General: alert, no acute distress  Eyes:  anicteric, no conjunctival injection, no discharge  Oropharynx: no lesions or injection 	  Neck: supple, without adenopathy  Lungs: clear to auscultation  Heart: regular rate and rhythm; no murmur, rubs or gallops  Abdomen: soft, nondistended, nontender, without mass or organomegaly  Skin: papular lesions on the medial side of the knee  Extremities: no clubbing, cyanosis, or edema. knee wound clean and dry.   Neurologic: alert, oriented, moves all extremities    LAB RESULTS:                        15.1   6.14  )-----------( 225      ( 24 Aug 2018 08:05 )             43.1     08-24    134<L>  |  96  |  9   ----------------------------<  111<H>  3.7   |  24  |  0.85    Ca    9.6      24 Aug 2018 07:11    TPro  7.7  /  Alb  4.1  /  TBili  0.5  /  DBili  x   /  AST  33  /  ALT  55<H>  /  AlkPhos  112  08-24    LIVER FUNCTIONS - ( 24 Aug 2018 07:11 )  Alb: 4.1 g/dL / Pro: 7.7 g/dL / ALK PHOS: 112 U/L / ALT: 55 U/L / AST: 33 U/L / GGT: x             MICROBIOLOGY:  RECENT CULTURES:  08-21 @ 07:36 .Tissue Other, right knee synovium     No growth    Moderate polymorphonuclear leukocytes seen per low power field  Numerous Red blood cells seen per low power field  No organisms seen per oil power field    08-21 @ 07:29 .Surgical Swab right knee synovial fluid #3     No growth          RADIOLOGY REVIEWED:    < from: MR Knee w/wo IV Cont, Right (08.23.18 @ 21:43) >  IMPRESSION:   1.  Postsurgical changes of the quadriceps tendon with surrounding soft   tissue swelling and edema that may reflect postsurgical changes.   Superimposed infection is not excluded.  2.  Moderate mildly complex knee joint effusion with synovial   enhancement. The setting of recent surgery, differential includes   postsurgical changes and synovitis of infectious or inflammatory etiology.  3.  Nonspecific distal vastus lateralis and vastus medialis edema and   enhancement, likely compatible with postsurgical change. Differential   includes myositis of infectious or inflammatory etiology.  4.  Nondisplaced posterior horn tear in the medial meniscus  5.  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    < end of copied text >      Assessment:  Patient with presumed culture negative right knee septic arthritis. cannot r/o reactive arthritis. cultures and lyme and gc chlam are all negative.   Plan: for now continue vanco and ceftriaxone  will anticipate 3 weeks of antibiotics, favor picc  Favor rheumatology evaluation

## 2018-08-26 DIAGNOSIS — M00.9 PYOGENIC ARTHRITIS, UNSPECIFIED: ICD-10-CM

## 2018-08-26 LAB
CULTURE RESULTS: SIGNIFICANT CHANGE UP
SPECIMEN SOURCE: SIGNIFICANT CHANGE UP
VANCOMYCIN TROUGH SERPL-MCNC: 14.6 UG/ML — SIGNIFICANT CHANGE UP (ref 10–20)

## 2018-08-26 RX ORDER — HYDROCORTISONE 1 %
1 OINTMENT (GRAM) TOPICAL
Qty: 0 | Refills: 0 | Status: DISCONTINUED | OUTPATIENT
Start: 2018-08-26 | End: 2018-08-29

## 2018-08-26 RX ORDER — DIPHENHYDRAMINE HCL 50 MG
25 CAPSULE ORAL EVERY 4 HOURS
Qty: 0 | Refills: 0 | Status: DISCONTINUED | OUTPATIENT
Start: 2018-08-26 | End: 2018-08-29

## 2018-08-26 RX ADMIN — TRAMADOL HYDROCHLORIDE 50 MILLIGRAM(S): 50 TABLET ORAL at 06:06

## 2018-08-26 RX ADMIN — PANTOPRAZOLE SODIUM 40 MILLIGRAM(S): 20 TABLET, DELAYED RELEASE ORAL at 13:56

## 2018-08-26 RX ADMIN — Medication 325 MILLIGRAM(S): at 13:57

## 2018-08-26 RX ADMIN — Medication 166.67 MILLIGRAM(S): at 21:42

## 2018-08-26 RX ADMIN — Medication 166.67 MILLIGRAM(S): at 07:01

## 2018-08-26 RX ADMIN — Medication 1 APPLICATION(S): at 18:07

## 2018-08-26 RX ADMIN — Medication 166.67 MILLIGRAM(S): at 13:56

## 2018-08-26 RX ADMIN — TRAMADOL HYDROCHLORIDE 50 MILLIGRAM(S): 50 TABLET ORAL at 06:36

## 2018-08-26 RX ADMIN — CEFTRIAXONE 100 GRAM(S): 500 INJECTION, POWDER, FOR SOLUTION INTRAMUSCULAR; INTRAVENOUS at 18:27

## 2018-08-26 NOTE — PROGRESS NOTE ADULT - SUBJECTIVE AND OBJECTIVE BOX
ORTHO  Patient is a 32y old  Male who presents with a chief complaint of Right knee pain/swelling (20 Aug 2018 11:14)    Pt. resting without complaint    VS-  Vital Signs Last 24 Hrs  T(C): 36.6 (26 Aug 2018 05:00), Max: 36.9 (25 Aug 2018 21:52)  T(F): 97.8 (26 Aug 2018 05:00), Max: 98.4 (25 Aug 2018 21:52)  HR: 73 (26 Aug 2018 05:00) (66 - 73)  BP: 122/78 (26 Aug 2018 05:00) (107/77 - 122/78)  BP(mean): --  RR: 18 (26 Aug 2018 05:00) (18 - 18)  SpO2: 99% (26 Aug 2018 05:00) (97% - 100%)    M.S. A&O  Extremity- R LE- bulky dressing in place; CDI  Neuro-              Motor- (+)ankle DF/PF              Sensation- grossly intact to light touch              Calves- soft, nontender                               15.1   6.14  )-----------( 225      ( 24 Aug 2018 08:05 )             43.1     08-24    134<L>  |  96  |  9   ----------------------------<  111<H>  3.7   |  24  |  0.85    Ca    9.6      24 Aug 2018 07:11    TPro  7.7  /  Alb  4.1  /  TBili  0.5  /  DBili  x   /  AST  33  /  ALT  55<H>  /  AlkPhos  112  08-24

## 2018-08-26 NOTE — PROGRESS NOTE ADULT - SUBJECTIVE AND OBJECTIVE BOX
CC: f/u for presumed septic right knee    Patient reports knee better, rash around knee is better    REVIEW OF SYSTEMS:  All other review of systems negative (Comprehensive ROS)    Antimicrobials Day #  :6  cefTRIAXone   IVPB 2 Gram(s) IV Intermittent every 24 hours  vancomycin  IVPB 1250 milliGRAM(s) IV Intermittent every 8 hours    Other Medications Reviewed    T(F): 97.6 (08-26-18 @ 16:44), Max: 98.4 (08-25-18 @ 21:52)  HR: 64 (08-26-18 @ 16:44)  BP: 121/64 (08-26-18 @ 16:44)  RR: 18 (08-26-18 @ 16:44)  SpO2: 99% (08-26-18 @ 16:44)  Wt(kg): --    PHYSICAL EXAM:  General: alert, no acute distress  Eyes:  anicteric, no conjunctival injection, no discharge  Oropharynx: no lesions or injection 	  Neck: supple, without adenopathy  Lungs: clear to auscultation  Heart: regular rate and rhythm; no murmur, rubs or gallops  Abdomen: soft, nondistended, nontender, without mass or organomegaly  Skin: no lesions  Extremities: no clubbing, cyanosis, or edema  Neurologic: alert, oriented, moves all extremities    LAB RESULTS:              MICROBIOLOGY:  RECENT CULTURES:  Culture - Tissue with Gram Stain (08.21.18 @ 07:36)    Gram Stain:   Moderate polymorphonuclear leukocytes seen per low power field  Numerous Red blood cells seen per low power field  No organisms seen per oil power field    Specimen Source: .Tissue Other, right knee synovium    Culture Results:   No growth at 5 days        RADIOLOGY REVIEWED:    < from: MR Knee w/wo IV Cont, Right (08.23.18 @ 21:43) >  IMPRESSION:   1.  Postsurgical changes of the quadriceps tendon with surrounding soft   tissue swelling and edema that may reflect postsurgical changes.   Superimposed infection is not excluded.  2.  Moderate mildly complex knee joint effusion with synovial   enhancement. The setting of recent surgery, differential includes   postsurgical changes and synovitis of infectious or inflammatory etiology.  3.  Nonspecific distal vastus lateralis and vastus medialis edema and   enhancement, likely compatible with postsurgical change. Differential   includes myositis of infectious or inflammatory etiology.  4.  Nondisplaced posterior horn tear in the medial meniscus  5.  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    < end of copied text >      Assessment:  Patient with monoarticular arthritis s/p washout, cultures negative, gc chlam neg and  and denies any other partners, lyme negative. He seems to be getting much better on antibiotics so still cannot r/o culture negative septic arthritis. He is adamant about not wanting to do IV antibiotics.   Plan:  continue vanco and ceftriaxone for at least 2 more days. If he still is doing well and refusing picc will do another 2 weeks of doxycycline and po cefdinir  favor rheum eval for possible reactive arthritis.

## 2018-08-26 NOTE — PROGRESS NOTE ADULT - ASSESSMENT
Patient is a 32y old  Male who presents with a chief complaint of Right knee pain/swelling (20 Aug 2018 11:14) s/p I&D - POD #6

## 2018-08-26 NOTE — PROGRESS NOTE ADULT - PROBLEM SELECTOR PLAN 1
- Pain control  - DVT ppx  - WBAT/OOB  - Venodynes, IS  - Continue current tx   - Cont Abx     Ibrahima Hernandez PA-C  Orthopedic Surgery  Pager: [phone]  Spectra: [phone]

## 2018-08-27 LAB — VANCOMYCIN TROUGH SERPL-MCNC: 15.4 UG/ML — SIGNIFICANT CHANGE UP (ref 10–20)

## 2018-08-27 PROCEDURE — 99253 IP/OBS CNSLTJ NEW/EST LOW 45: CPT | Mod: GC

## 2018-08-27 RX ADMIN — TRAMADOL HYDROCHLORIDE 50 MILLIGRAM(S): 50 TABLET ORAL at 05:45

## 2018-08-27 RX ADMIN — TRAMADOL HYDROCHLORIDE 50 MILLIGRAM(S): 50 TABLET ORAL at 22:05

## 2018-08-27 RX ADMIN — Medication 100 MILLIGRAM(S): at 22:05

## 2018-08-27 RX ADMIN — Medication 166.67 MILLIGRAM(S): at 06:13

## 2018-08-27 RX ADMIN — TRAMADOL HYDROCHLORIDE 50 MILLIGRAM(S): 50 TABLET ORAL at 15:25

## 2018-08-27 RX ADMIN — TRAMADOL HYDROCHLORIDE 50 MILLIGRAM(S): 50 TABLET ORAL at 22:35

## 2018-08-27 RX ADMIN — Medication 325 MILLIGRAM(S): at 11:53

## 2018-08-27 RX ADMIN — CEFTRIAXONE 100 GRAM(S): 500 INJECTION, POWDER, FOR SOLUTION INTRAMUSCULAR; INTRAVENOUS at 17:14

## 2018-08-27 RX ADMIN — PANTOPRAZOLE SODIUM 40 MILLIGRAM(S): 20 TABLET, DELAYED RELEASE ORAL at 11:53

## 2018-08-27 RX ADMIN — Medication 1 APPLICATION(S): at 17:15

## 2018-08-27 RX ADMIN — Medication 166.67 MILLIGRAM(S): at 15:21

## 2018-08-27 RX ADMIN — Medication 1 APPLICATION(S): at 05:46

## 2018-08-27 RX ADMIN — Medication 166.67 MILLIGRAM(S): at 22:05

## 2018-08-27 NOTE — PROGRESS NOTE ADULT - SUBJECTIVE AND OBJECTIVE BOX
Patient is a 32y old  Male who presents with a chief complaint of Right knee pain/swelling  Patient s/p I&D right knee POD#7  Patient comfortable  No complaints    T(C): 36.8 (08-27-18 @ 05:10), Max: 36.8 (08-27-18 @ 05:10)  HR: 67 (08-27-18 @ 05:10) (64 - 67)  BP: 117/74 (08-27-18 @ 05:10) (104/69 - 127/74)  RR: 18 (08-27-18 @ 05:10) (18 - 18)  SpO2: 98% (08-27-18 @ 05:10) (97% - 99%)    PHYSICAL EXAM:  NAD, Alert  [Right ] Knee: Incision C/D/I; sensation grossly intact to light touch; (+) DF/PF; (+) Distal Pulses; No Calf tenderness B/L, PAS

## 2018-08-27 NOTE — CONSULT NOTE ADULT - SUBJECTIVE AND OBJECTIVE BOX
KIM DESEAN  [id number]    HISTORY OF PRESENT ILLNESS:  32M with presented to [facility] with 1 day of R knee pain/swelling. Pt progressed over a period of 1 day, could not bear weight. No falls or trauma to area. No fevers/chills. ED staff aspirated R knee and state about 60cc of cloudy fluid was drained. Cell count 73K, PMNs 79. Pt went to OR, and had washout of R knee. Pt then subsequently was tx with vanc/ceftriaxone since 8/20. Given cx have been negative, GC chlamydia negative, and lyme serology negative, we were consulted for possibility of reactive arthritis.       PAST MEDICAL & SURGICAL HISTORY:  No pertinent past medical history  No significant past surgical history      Review of Systems:  Gen:  No fevers/chills, weight loss  HEENT: No blurry vision, no difficulty swallowing  CVS: No chest pain/palpitations  Resp: No SOB/wheezing  GI: No N/V/C/D/abdominal pain  MSK:  Skin: No new rashes  Neuro: No headaches    MEDICATIONS  (STANDING):  aspirin enteric coated 325 milliGRAM(s) Oral daily  cefTRIAXone   IVPB 2 Gram(s) IV Intermittent every 24 hours  docusate sodium 100 milliGRAM(s) Oral three times a day  hydrocortisone 1% Cream 1 Application(s) Topical two times a day  lactated ringers. 1000 milliLiter(s) (100 mL/Hr) IV Continuous <Continuous>  pantoprazole    Tablet 40 milliGRAM(s) Oral daily  polyethylene glycol 3350 17 Gram(s) Oral daily  vancomycin  IVPB 1250 milliGRAM(s) IV Intermittent every 8 hours    MEDICATIONS  (PRN):  acetaminophen   Tablet 650 milliGRAM(s) Oral every 6 hours PRN For Temp over 38.3 C (100.94 F)  acetaminophen  IVPB. 1000 milliGRAM(s) IV Intermittent every 8 hours PRN pain resistant to narcotics  aluminum hydroxide/magnesium hydroxide/simethicone Suspension 30 milliLiter(s) Oral four times a day PRN Indigestion  diphenhydrAMINE   Capsule 25 milliGRAM(s) Oral every 4 hours PRN Rash and/or Itching  HYDROmorphone   Tablet 2 milliGRAM(s) Oral every 3 hours PRN Moderate Pain (4 - 6)  HYDROmorphone   Tablet 4 milliGRAM(s) Oral every 3 hours PRN Severe Pain (7 - 10)  magnesium hydroxide Suspension 30 milliLiter(s) Oral daily PRN Constipation  morphine  - Injectable 2 milliGRAM(s) IV Push every 4 hours PRN breakthrough pain  ondansetron Injectable 4 milliGRAM(s) IV Push every 6 hours PRN Nausea and/or Vomiting  senna 2 Tablet(s) Oral at bedtime PRN Constipation  traMADol 50 milliGRAM(s) Oral every 6 hours PRN Mild Pain (1 - 3)      Allergies    No Known Allergies    Intolerances        PERTINENT MEDICATION HISTORY:    SOCIAL HISTORY:  OCCUPATION:  TRAVEL HISTORY:    FAMILY HISTORY:  No pertinent family history in first degree relatives      Vital Signs Last 24 Hrs  T(C): 36.6 (27 Aug 2018 09:40), Max: 36.8 (27 Aug 2018 05:10)  T(F): 97.8 (27 Aug 2018 09:40), Max: 98.3 (27 Aug 2018 05:10)  HR: 71 (27 Aug 2018 09:40) (64 - 71)  BP: 104/62 (27 Aug 2018 09:40) (104/62 - 122/77)  BP(mean): --  RR: 18 (27 Aug 2018 09:40) (18 - 18)  SpO2: 98% (27 Aug 2018 09:40) (97% - 99%)    Daily     Daily     Physical Exam:  General: No apparent distress  HEENT: EOMI, MMM  CVS: +S1/S2, RRR  Resp: CTA b/l  GI: Soft, NT/ND  MSK:    Neuro: AAOx3  muscle strength RUE LUE ; RLE LLE   Skin: no  rashes    LABS:    Cell Count, Body Fluid (08.20.18 @ 07:08)    Eosinophil Count - Body Fluid: 8 %    Monocyte/Macrophage Count - Body Fluid: 11 %    Fluid Segmented Granulocytes: 79 %    Fluid Type: Synovial fluid    Body Fluid Appearance: Cloudy    BF Lymphocytes: 2 %    Tube Type: Sterile    Color - Body Fluid: Yellow    Total Nucleated Cell Count, Body Fluid: 37535: Includes WBC and other nucleated cells if present. /uL    Total RBC Count: 50 /uL    Crystals, Synovial Fluid:   RESULT: negative for crystals  Ref Range: None Seen (08.20.18 @ 08:15)      Culture - Tissue with Gram Stain (08.21.18 @ 07:36)    Gram Stain:   Moderate polymorphonuclear leukocytes seen per low power field  Numerous Red blood cells seen per low power field  No organisms seen per oil power field    Specimen Source: .Tissue Other, right knee synovium    Culture Results:   No growth at 5 days    Borrelia burgdorferi IgG/IgM Antibodies (08.21.18 @ 14:22)    LYME IgG/IgM Antibodies Result: 0.31 Index    Lyme C6 Interpretation: Negative    Chlamydia Amplification Result: NotDetec:    GC amplification : (-)        RADIOLOGY & ADDITIONAL STUDIES:    < from: MR Knee w/wo IV Cont, Right (08.23.18 @ 21:43) >  IMPRESSION:   1.  Postsurgical changes of the quadriceps tendon with surrounding soft   tissue swelling and edema that may reflect postsurgical changes.   Superimposed infection is not excluded.  2.  Moderate mildly complex knee joint effusion with synovial   enhancement. The setting of recent surgery, differential includes   postsurgical changes and synovitis of infectious or inflammatory etiology.  3.  Nonspecific distal vastus lateralis and vastus medialis edema and   enhancement, likely compatible with postsurgical change. Differential   includes myositis of infectious or inflammatory etiology.  4.  Nondisplaced posterior horn tear in the medial meniscus  5.  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                        LAMBERT BRYSON M.D., ATTENDING RADIOLOGIST  This document has been electronically signed. Aug 24 2018  9:13AM    < end of copied text > KIM DESEAN  [id number]    HISTORY OF PRESENT ILLNESS:  32M with presented to [facility] with 1 day of R knee pain/swelling. Pt progressed over a period of 1 day, could not bear weight. No falls or trauma to area. No fevers/chills. ED staff aspirated R knee and state about 60cc of cloudy fluid was drained. Cell count 73K, PMNs 79. Pt went to OR, and had washout of R knee. Pt then subsequently was tx with vanc/ceftriaxone since 8/20. Given cx have been negative, GC chlamydia negative, and lyme serology negative, we were consulted for possibility of reactive arthritis.     Pt has improved greatly since washout and abx. Pt says the swelling is down, and he can move knee. Still some residual pain left. Pt works as [employer]. Pt denies any tick bites, rashes, fevers, chills. Pt says he is only sexually active with his wife. No dysuria, penile lesions. Pt denies any visual problems. Pt diet consists of chicken, vegetables. Pt denies eating excessive meat. Eats seafood, sometimes shellfish, once a week.       PAST MEDICAL & SURGICAL HISTORY:  No pertinent past medical history  No significant past surgical history      Review of Systems:  Gen:  No fevers/chills, weight loss  HEENT: No blurry vision, no difficulty swallowing  CVS: No chest pain/palpitations  Resp: No SOB/wheezing  GI: No N/V/C/D/abdominal pain  MSK:  Skin: No new rashes  Neuro: No headaches    MEDICATIONS  (STANDING):  aspirin enteric coated 325 milliGRAM(s) Oral daily  cefTRIAXone   IVPB 2 Gram(s) IV Intermittent every 24 hours  docusate sodium 100 milliGRAM(s) Oral three times a day  hydrocortisone 1% Cream 1 Application(s) Topical two times a day  lactated ringers. 1000 milliLiter(s) (100 mL/Hr) IV Continuous <Continuous>  pantoprazole    Tablet 40 milliGRAM(s) Oral daily  polyethylene glycol 3350 17 Gram(s) Oral daily  vancomycin  IVPB 1250 milliGRAM(s) IV Intermittent every 8 hours    MEDICATIONS  (PRN):  acetaminophen   Tablet 650 milliGRAM(s) Oral every 6 hours PRN For Temp over 38.3 C (100.94 F)  acetaminophen  IVPB. 1000 milliGRAM(s) IV Intermittent every 8 hours PRN pain resistant to narcotics  aluminum hydroxide/magnesium hydroxide/simethicone Suspension 30 milliLiter(s) Oral four times a day PRN Indigestion  diphenhydrAMINE   Capsule 25 milliGRAM(s) Oral every 4 hours PRN Rash and/or Itching  HYDROmorphone   Tablet 2 milliGRAM(s) Oral every 3 hours PRN Moderate Pain (4 - 6)  HYDROmorphone   Tablet 4 milliGRAM(s) Oral every 3 hours PRN Severe Pain (7 - 10)  magnesium hydroxide Suspension 30 milliLiter(s) Oral daily PRN Constipation  morphine  - Injectable 2 milliGRAM(s) IV Push every 4 hours PRN breakthrough pain  ondansetron Injectable 4 milliGRAM(s) IV Push every 6 hours PRN Nausea and/or Vomiting  senna 2 Tablet(s) Oral at bedtime PRN Constipation  traMADol 50 milliGRAM(s) Oral every 6 hours PRN Mild Pain (1 - 3)      Allergies    No Known Allergies    Intolerances        PERTINENT MEDICATION HISTORY:    SOCIAL HISTORY:  OCCUPATION:  TRAVEL HISTORY:    FAMILY HISTORY:  No pertinent family history in first degree relatives      Vital Signs Last 24 Hrs  T(C): 36.6 (27 Aug 2018 09:40), Max: 36.8 (27 Aug 2018 05:10)  T(F): 97.8 (27 Aug 2018 09:40), Max: 98.3 (27 Aug 2018 05:10)  HR: 71 (27 Aug 2018 09:40) (64 - 71)  BP: 104/62 (27 Aug 2018 09:40) (104/62 - 122/77)  BP(mean): --  RR: 18 (27 Aug 2018 09:40) (18 - 18)  SpO2: 98% (27 Aug 2018 09:40) (97% - 99%)    Daily     Daily     Physical Exam:  General: No apparent distress  HEENT: EOMI, MMM  CVS: +S1/S2, RRR  Resp: CTA b/l  GI: Soft, NT/ND  MSK:    Neuro: AAOx3  muscle strength RUE LUE ; RLE LLE   Skin: no  rashes    LABS:    Cell Count, Body Fluid (08.20.18 @ 07:08)    Eosinophil Count - Body Fluid: 8 %    Monocyte/Macrophage Count - Body Fluid: 11 %    Fluid Segmented Granulocytes: 79 %    Fluid Type: Synovial fluid    Body Fluid Appearance: Cloudy    BF Lymphocytes: 2 %    Tube Type: Sterile    Color - Body Fluid: Yellow    Total Nucleated Cell Count, Body Fluid: 90806: Includes WBC and other nucleated cells if present. /uL    Total RBC Count: 50 /uL    Crystals, Synovial Fluid:   RESULT: negative for crystals  Ref Range: None Seen (08.20.18 @ 08:15)      Culture - Tissue with Gram Stain (08.21.18 @ 07:36)    Gram Stain:   Moderate polymorphonuclear leukocytes seen per low power field  Numerous Red blood cells seen per low power field  No organisms seen per oil power field    Specimen Source: .Tissue Other, right knee synovium    Culture Results:   No growth at 5 days    Borrelia burgdorferi IgG/IgM Antibodies (08.21.18 @ 14:22)    LYME IgG/IgM Antibodies Result: 0.31 Index    Lyme C6 Interpretation: Negative    Chlamydia Amplification Result: NotDetec:    GC amplification : (-)        RADIOLOGY & ADDITIONAL STUDIES:    < from: MR Knee w/wo IV Cont, Right (08.23.18 @ 21:43) >  IMPRESSION:   1.  Postsurgical changes of the quadriceps tendon with surrounding soft   tissue swelling and edema that may reflect postsurgical changes.   Superimposed infection is not excluded.  2.  Moderate mildly complex knee joint effusion with synovial   enhancement. The setting of recent surgery, differential includes   postsurgical changes and synovitis of infectious or inflammatory etiology.  3.  Nonspecific distal vastus lateralis and vastus medialis edema and   enhancement, likely compatible with postsurgical change. Differential   includes myositis of infectious or inflammatory etiology.  4.  Nondisplaced posterior horn tear in the medial meniscus  5.  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                        LAMBERT BRYSON M.D., ATTENDING RADIOLOGIST  This document has been electronically signed. Aug 24 2018  9:13AM    < end of copied text > KIM DESEAN  [id number]    HISTORY OF PRESENT ILLNESS:  32M with presented to [facility] with 1 day of R knee pain/swelling on 8/20. Pt progressed over a period of 1 day, could not bear weight. No falls or trauma to area. No fevers/chills. ED staff aspirated R knee  60cc of cloudy fluid was drained. Cell count 73K, PMNs 79. Pt went to OR, and had washout of R knee. Pt then subsequently was tx with vanc/ceftriaxone since 8/20. Given cx have been negative, GC chlamydia negative, and lyme serology negative, we were consulted for possibility of reactive arthritis.     Pt has improved greatly since washout and abx. Pt says the swelling is down, and he can move knee. Still some residual pain left. Pt works as . Pt denies any tick bites, rashes, fevers, chills. Only sexually active with his wife. No dysuria, penile lesions. Pt denies any visual problems. No recent dental work. Pt diet consists of chicken, vegetables. Pt denies eating excessive meat. Eats seafood, sometimes shellfish, once a week.       PAST MEDICAL & SURGICAL HISTORY:  No pertinent past medical history  No significant past surgical history      Review of Systems:  Gen:  No fevers/chills  HEENT: No blurry vision  CVS: No chest pain/palpitations  Resp: No SOB/wheezing  GI: No N/V/C/D/abdominal pain  MSK: per HPI   Skin: No new rashes  Neuro: No headaches, focal weakness    MEDICATIONS  (STANDING):  aspirin enteric coated 325 milliGRAM(s) Oral daily  cefTRIAXone   IVPB 2 Gram(s) IV Intermittent every 24 hours  docusate sodium 100 milliGRAM(s) Oral three times a day  hydrocortisone 1% Cream 1 Application(s) Topical two times a day  lactated ringers. 1000 milliLiter(s) (100 mL/Hr) IV Continuous <Continuous>  pantoprazole    Tablet 40 milliGRAM(s) Oral daily  polyethylene glycol 3350 17 Gram(s) Oral daily  vancomycin  IVPB 1250 milliGRAM(s) IV Intermittent every 8 hours    MEDICATIONS  (PRN):  acetaminophen   Tablet 650 milliGRAM(s) Oral every 6 hours PRN For Temp over 38.3 C (100.94 F)  acetaminophen  IVPB. 1000 milliGRAM(s) IV Intermittent every 8 hours PRN pain resistant to narcotics  aluminum hydroxide/magnesium hydroxide/simethicone Suspension 30 milliLiter(s) Oral four times a day PRN Indigestion  diphenhydrAMINE   Capsule 25 milliGRAM(s) Oral every 4 hours PRN Rash and/or Itching  HYDROmorphone   Tablet 2 milliGRAM(s) Oral every 3 hours PRN Moderate Pain (4 - 6)  HYDROmorphone   Tablet 4 milliGRAM(s) Oral every 3 hours PRN Severe Pain (7 - 10)  magnesium hydroxide Suspension 30 milliLiter(s) Oral daily PRN Constipation  morphine  - Injectable 2 milliGRAM(s) IV Push every 4 hours PRN breakthrough pain  ondansetron Injectable 4 milliGRAM(s) IV Push every 6 hours PRN Nausea and/or Vomiting  senna 2 Tablet(s) Oral at bedtime PRN Constipation  traMADol 50 milliGRAM(s) Oral every 6 hours PRN Mild Pain (1 - 3)      Allergies    No Known Allergies    Intolerances        PERTINENT MEDICATION HISTORY:    SOCIAL HISTORY: no toxic habits, sexually active only with wife  OCCUPATION:       FAMILY HISTORY:  No pertinent family history in first degree relatives      Vital Signs Last 24 Hrs  T(C): 36.6 (27 Aug 2018 09:40), Max: 36.8 (27 Aug 2018 05:10)  T(F): 97.8 (27 Aug 2018 09:40), Max: 98.3 (27 Aug 2018 05:10)  HR: 71 (27 Aug 2018 09:40) (64 - 71)  BP: 104/62 (27 Aug 2018 09:40) (104/62 - 122/77)  BP(mean): --  RR: 18 (27 Aug 2018 09:40) (18 - 18)  SpO2: 98% (27 Aug 2018 09:40) (97% - 99%)    Daily     Daily     Physical Exam:  General: No apparent distress  CVS: +S1/S2, RRR, no m,r,g   Resp: CTA b/l, no w,c   GI: Soft, NT/ND  MSK:  R knee swollwen warn, w surgical scar c/d/i  Neuro: AAOx3  Skin: no  rashes. No Osler nodes, Janeway's lesions. No splinter hemorrhage on nails.      LABS:    Cell Count, Body Fluid (08.20.18 @ 07:08)    Eosinophil Count - Body Fluid: 8 %    Monocyte/Macrophage Count - Body Fluid: 11 %    Fluid Segmented Granulocytes: 79 %    Fluid Type: Synovial fluid    Body Fluid Appearance: Cloudy    BF Lymphocytes: 2 %    Tube Type: Sterile    Color - Body Fluid: Yellow    Total Nucleated Cell Count, Body Fluid: 86647: Includes WBC and other nucleated cells if present. /uL    Total RBC Count: 50 /uL    Crystals, Synovial Fluid:   RESULT: negative for crystals  Ref Range: None Seen (08.20.18 @ 08:15)      Culture - Tissue with Gram Stain (08.21.18 @ 07:36)    Gram Stain:   Moderate polymorphonuclear leukocytes seen per low power field  Numerous Red blood cells seen per low power field  No organisms seen per oil power field    Specimen Source: .Tissue Other, right knee synovium    Culture Results:   No growth at 5 days    Borrelia burgdorferi IgG/IgM Antibodies (08.21.18 @ 14:22)    LYME IgG/IgM Antibodies Result: 0.31 Index    Lyme C6 Interpretation: Negative    Chlamydia Amplification Result: NotDetec:    GC amplification : (-)        RADIOLOGY & ADDITIONAL STUDIES:    < from: MR Knee w/wo IV Cont, Right (08.23.18 @ 21:43) >  IMPRESSION:   1.  Postsurgical changes of the quadriceps tendon with surrounding soft   tissue swelling and edema that may reflect postsurgical changes.   Superimposed infection is not excluded.  2.  Moderate mildly complex knee joint effusion with synovial   enhancement. The setting of recent surgery, differential includes   postsurgical changes and synovitis of infectious or inflammatory etiology.  3.  Nonspecific distal vastus lateralis and vastus medialis edema and   enhancement, likely compatible with postsurgical change. Differential   includes myositis of infectious or inflammatory etiology.  4.  Nondisplaced posterior horn tear in the medial meniscus  5.  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                        LAMBERT BRYSON M.D., ATTENDING RADIOLOGIST  This document has been electronically signed. Aug 24 2018  9:13AM    < end of copied text > KIM DESEAN  [id number]    HISTORY OF PRESENT ILLNESS:  32M with presented to [facility] with 1 day of R knee pain/swelling on 8/20. Pt progressed over a period of 1 day, could not bear weight. No falls or trauma to area. No fevers/chills. ED staff aspirated R knee  60cc of cloudy fluid was drained. Cell count 73K, PMNs 79. Pt went to OR, and had washout of R knee. Pt then subsequently was tx with vanc/ceftriaxone since 8/20. Given cx have been negative, GC chlamydia negative, and lyme serology negative, we were consulted for possibility of reactive arthritis.     Pt has improved greatly since washout and abx. Pt says the swelling is down, and he can move knee. Still some residual pain left. Pt works as . Pt denies any tick bites, rashes, fevers, chills. Only sexually active with his wife. No dysuria, penile lesions. Denies any recent URI, GI illness.  Pt denies any visual problems. No recent dental work. Pt diet consists of chicken, vegetables. Pt denies eating excessive meat. Eats seafood, sometimes shellfish, once a week.       PAST MEDICAL & SURGICAL HISTORY:  No pertinent past medical history  No significant past surgical history      Review of Systems:  Gen:  No fevers/chills  HEENT: No blurry vision  CVS: No chest pain/palpitations  Resp: No SOB/wheezing  GI: No N/V/C/D/abdominal pain  MSK: per HPI   Skin: No new rashes  Neuro: No headaches, focal weakness    MEDICATIONS  (STANDING):  aspirin enteric coated 325 milliGRAM(s) Oral daily  cefTRIAXone   IVPB 2 Gram(s) IV Intermittent every 24 hours  docusate sodium 100 milliGRAM(s) Oral three times a day  hydrocortisone 1% Cream 1 Application(s) Topical two times a day  lactated ringers. 1000 milliLiter(s) (100 mL/Hr) IV Continuous <Continuous>  pantoprazole    Tablet 40 milliGRAM(s) Oral daily  polyethylene glycol 3350 17 Gram(s) Oral daily  vancomycin  IVPB 1250 milliGRAM(s) IV Intermittent every 8 hours    MEDICATIONS  (PRN):  acetaminophen   Tablet 650 milliGRAM(s) Oral every 6 hours PRN For Temp over 38.3 C (100.94 F)  acetaminophen  IVPB. 1000 milliGRAM(s) IV Intermittent every 8 hours PRN pain resistant to narcotics  aluminum hydroxide/magnesium hydroxide/simethicone Suspension 30 milliLiter(s) Oral four times a day PRN Indigestion  diphenhydrAMINE   Capsule 25 milliGRAM(s) Oral every 4 hours PRN Rash and/or Itching  HYDROmorphone   Tablet 2 milliGRAM(s) Oral every 3 hours PRN Moderate Pain (4 - 6)  HYDROmorphone   Tablet 4 milliGRAM(s) Oral every 3 hours PRN Severe Pain (7 - 10)  magnesium hydroxide Suspension 30 milliLiter(s) Oral daily PRN Constipation  morphine  - Injectable 2 milliGRAM(s) IV Push every 4 hours PRN breakthrough pain  ondansetron Injectable 4 milliGRAM(s) IV Push every 6 hours PRN Nausea and/or Vomiting  senna 2 Tablet(s) Oral at bedtime PRN Constipation  traMADol 50 milliGRAM(s) Oral every 6 hours PRN Mild Pain (1 - 3)      Allergies    No Known Allergies    Intolerances        PERTINENT MEDICATION HISTORY:    SOCIAL HISTORY: no toxic habits, sexually active only with wife  OCCUPATION:       FAMILY HISTORY:  No pertinent family history in first degree relatives      Vital Signs Last 24 Hrs  T(C): 36.6 (27 Aug 2018 09:40), Max: 36.8 (27 Aug 2018 05:10)  T(F): 97.8 (27 Aug 2018 09:40), Max: 98.3 (27 Aug 2018 05:10)  HR: 71 (27 Aug 2018 09:40) (64 - 71)  BP: 104/62 (27 Aug 2018 09:40) (104/62 - 122/77)  BP(mean): --  RR: 18 (27 Aug 2018 09:40) (18 - 18)  SpO2: 98% (27 Aug 2018 09:40) (97% - 99%)    Daily     Daily     Physical Exam:  General: No apparent distress  CVS: +S1/S2, RRR, no m,r,g   Resp: CTA b/l, no w,c   GI: Soft, NT/ND  MSK:  R knee swollwen warn, w surgical scar c/d/i  Neuro: AAOx3  Skin: no  rashes. No Osler nodes, Janeway's lesions. No splinter hemorrhage on nails.      LABS:    Cell Count, Body Fluid (08.20.18 @ 07:08)    Eosinophil Count - Body Fluid: 8 %    Monocyte/Macrophage Count - Body Fluid: 11 %    Fluid Segmented Granulocytes: 79 %    Fluid Type: Synovial fluid    Body Fluid Appearance: Cloudy    BF Lymphocytes: 2 %    Tube Type: Sterile    Color - Body Fluid: Yellow    Total Nucleated Cell Count, Body Fluid: 79043: Includes WBC and other nucleated cells if present. /uL    Total RBC Count: 50 /uL    Crystals, Synovial Fluid:   RESULT: negative for crystals  Ref Range: None Seen (08.20.18 @ 08:15)      Culture - Tissue with Gram Stain (08.21.18 @ 07:36)    Gram Stain:   Moderate polymorphonuclear leukocytes seen per low power field  Numerous Red blood cells seen per low power field  No organisms seen per oil power field    Specimen Source: .Tissue Other, right knee synovium    Culture Results:   No growth at 5 days    Borrelia burgdorferi IgG/IgM Antibodies (08.21.18 @ 14:22)    LYME IgG/IgM Antibodies Result: 0.31 Index    Lyme C6 Interpretation: Negative    Chlamydia Amplification Result: NotDetec:    GC amplification : (-)        RADIOLOGY & ADDITIONAL STUDIES:    < from: MR Knee w/wo IV Cont, Right (08.23.18 @ 21:43) >  IMPRESSION:   1.  Postsurgical changes of the quadriceps tendon with surrounding soft   tissue swelling and edema that may reflect postsurgical changes.   Superimposed infection is not excluded.  2.  Moderate mildly complex knee joint effusion with synovial   enhancement. The setting of recent surgery, differential includes   postsurgical changes and synovitis of infectious or inflammatory etiology.  3.  Nonspecific distal vastus lateralis and vastus medialis edema and   enhancement, likely compatible with postsurgical change. Differential   includes myositis of infectious or inflammatory etiology.  4.  Nondisplaced posterior horn tear in the medial meniscus  5.  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                        LAMBERT BRYSON M.D., ATTENDING RADIOLOGIST  This document has been electronically signed. Aug 24 2018  9:13AM    < end of copied text > KIM DESEAN  [id number]    HISTORY OF PRESENT ILLNESS:  32M with presented to [facility] with 1 day of R knee pain/swelling on 8/20. Pt progressed over a period of 1 day, could not bear weight. No falls or trauma to area. No fevers/chills. ED staff aspirated R knee  60cc of cloudy fluid was drained. Cell count 73K, PMNs 79. Pt was taken to  OR for washout of R knee. Pt then subsequently was tx with vanc/ceftriaxone since 8/20 and has improved significantly - able to ambulate with minimal pain. Given cx have been negative, GC chlamydia negative, and lyme serology negative, we were consulted for possibility of reactive arthritis.     Pt has improved greatly since washout and abx. Pt says the swelling is down, and he can move knee. Still some residual pain left. Pt works as . Pt denies any tick bites, rashes, fevers, chills. Only sexually active with his wife. No dysuria, penile lesions. Denies any recent URI, GI illness.  Pt denies any visual problems. No recent dental work. Pt diet consists of chicken, vegetables. Pt denies eating excessive meat. Eats seafood, sometimes shellfish, once a week.       PAST MEDICAL & SURGICAL HISTORY:  No pertinent past medical history  No significant past surgical history      Review of Systems:  Gen:  No fevers/chills  HEENT: No blurry vision  CVS: No chest pain/palpitations  Resp: No SOB/wheezing  GI: No N/V/C/D/abdominal pain  MSK: per HPI   Skin: No new rashes  Neuro: No headaches, focal weakness    MEDICATIONS  (STANDING):  aspirin enteric coated 325 milliGRAM(s) Oral daily  cefTRIAXone   IVPB 2 Gram(s) IV Intermittent every 24 hours  docusate sodium 100 milliGRAM(s) Oral three times a day  hydrocortisone 1% Cream 1 Application(s) Topical two times a day  lactated ringers. 1000 milliLiter(s) (100 mL/Hr) IV Continuous <Continuous>  pantoprazole    Tablet 40 milliGRAM(s) Oral daily  polyethylene glycol 3350 17 Gram(s) Oral daily  vancomycin  IVPB 1250 milliGRAM(s) IV Intermittent every 8 hours    MEDICATIONS  (PRN):  acetaminophen   Tablet 650 milliGRAM(s) Oral every 6 hours PRN For Temp over 38.3 C (100.94 F)  acetaminophen  IVPB. 1000 milliGRAM(s) IV Intermittent every 8 hours PRN pain resistant to narcotics  aluminum hydroxide/magnesium hydroxide/simethicone Suspension 30 milliLiter(s) Oral four times a day PRN Indigestion  diphenhydrAMINE   Capsule 25 milliGRAM(s) Oral every 4 hours PRN Rash and/or Itching  HYDROmorphone   Tablet 2 milliGRAM(s) Oral every 3 hours PRN Moderate Pain (4 - 6)  HYDROmorphone   Tablet 4 milliGRAM(s) Oral every 3 hours PRN Severe Pain (7 - 10)  magnesium hydroxide Suspension 30 milliLiter(s) Oral daily PRN Constipation  morphine  - Injectable 2 milliGRAM(s) IV Push every 4 hours PRN breakthrough pain  ondansetron Injectable 4 milliGRAM(s) IV Push every 6 hours PRN Nausea and/or Vomiting  senna 2 Tablet(s) Oral at bedtime PRN Constipation  traMADol 50 milliGRAM(s) Oral every 6 hours PRN Mild Pain (1 - 3)      Allergies    No Known Allergies    Intolerances        PERTINENT MEDICATION HISTORY:    SOCIAL HISTORY: no toxic habits, sexually active only with wife  OCCUPATION:       FAMILY HISTORY:  No pertinent family history in first degree relatives      Vital Signs Last 24 Hrs  T(C): 36.6 (27 Aug 2018 09:40), Max: 36.8 (27 Aug 2018 05:10)  T(F): 97.8 (27 Aug 2018 09:40), Max: 98.3 (27 Aug 2018 05:10)  HR: 71 (27 Aug 2018 09:40) (64 - 71)  BP: 104/62 (27 Aug 2018 09:40) (104/62 - 122/77)  BP(mean): --  RR: 18 (27 Aug 2018 09:40) (18 - 18)  SpO2: 98% (27 Aug 2018 09:40) (97% - 99%)    Daily     Daily     Physical Exam:  General: No apparent distress  CVS: +S1/S2, RRR, no m,r,g   Resp: CTA b/l, no w,c   GI: Soft, NT/ND  MSK:  R knee swollen, warm, w surgical scar c/d/i  Neuro: AAOx3  Skin: no  rashes. No Osler nodes, Janeway's lesions. No splinter hemorrhage on nails.      LABS:    Cell Count, Body Fluid (08.20.18 @ 07:08)    Eosinophil Count - Body Fluid: 8 %    Monocyte/Macrophage Count - Body Fluid: 11 %    Fluid Segmented Granulocytes: 79 %    Fluid Type: Synovial fluid    Body Fluid Appearance: Cloudy    BF Lymphocytes: 2 %    Tube Type: Sterile    Color - Body Fluid: Yellow    Total Nucleated Cell Count, Body Fluid: 22730: Includes WBC and other nucleated cells if present. /uL    Total RBC Count: 50 /uL    Crystals, Synovial Fluid:   RESULT: negative for crystals  Ref Range: None Seen (08.20.18 @ 08:15)      Culture - Tissue with Gram Stain (08.21.18 @ 07:36)    Gram Stain:   Moderate polymorphonuclear leukocytes seen per low power field  Numerous Red blood cells seen per low power field  No organisms seen per oil power field    Specimen Source: .Tissue Other, right knee synovium    Culture Results:   No growth at 5 days    Borrelia burgdorferi IgG/IgM Antibodies (08.21.18 @ 14:22)    LYME IgG/IgM Antibodies Result: 0.31 Index    Lyme C6 Interpretation: Negative    Chlamydia Amplification Result: NotDetec:    GC amplification : (-)      RADIOLOGY & ADDITIONAL STUDIES:    < from: MR Knee w/wo IV Cont, Right (08.23.18 @ 21:43) >  IMPRESSION:   1.  Postsurgical changes of the quadriceps tendon with surrounding soft   tissue swelling and edema that may reflect postsurgical changes.   Superimposed infection is not excluded.  2.  Moderate mildly complex knee joint effusion with synovial   enhancement. The setting of recent surgery, differential includes   postsurgical changes and synovitis of infectious or inflammatory etiology.  3.  Nonspecific distal vastus lateralis and vastus medialis edema and   enhancement, likely compatible with postsurgical change. Differential   includes myositis of infectious or inflammatory etiology.  4.  Nondisplaced posterior horn tear in the medial meniscus  5.  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                        LAMBERT BRYSON M.D., ATTENDING RADIOLOGIST  This document has been electronically signed. Aug 24 2018  9:13AM    < end of copied text >

## 2018-08-27 NOTE — PROGRESS NOTE ADULT - ASSESSMENT
31 y/o M s/p I&D right knee POD#7. as per infectious  disease 2 more days of IV, then PO's, Rheumatology called  Jessica Pressley PA-C  Orthopaedic Surgery  Team pager [phone]  [facility] [phone]  [phone]

## 2018-08-27 NOTE — CONSULT NOTE ADULT - ASSESSMENT
33 y/o M presents w R knee acute arthritis, most likely 2/2 to septic arthritis  Pt has improved with abx and wash out  It is unusual for ESR to be mildly elevated, and PMS are <90%, however pt has no risk factors for crystal arthropathy. 31 y/o M presents w R knee acute arthritis, most likely 2/2 to septic arthritis  Pt has improved with abx and wash out  It is unusual for ESR to be mildly elevated, and PMS are <90%, however pt has no risk factors for crystal arthropathy  Pt no hx of recent URI or GI illness, and no visual or genitourinary complaints, so not likely reactive arthritis    Recommend  Would continue with abx as per ID for septic arthritis  No roles for steroids at this moment    Zane Dickinson, PGY4  [phone] 31 y/o M presents w R knee acute arthritis, most likely 2/2 to septic arthritis. Although iIt is unusual for ESR to be mildly elevated, and PMS are <90% in septic arthritis  Pt has improved with I &D and  abx   No risk factors for crystal arthropathy and no crystals in synovial fluid seen  Pt no hx of recent URI/ UTI or GI illness, and no visual or genitourinary complaints, so not likely reactive arthritis    Recommend  Would continue with abx as per ID for septic arthritis  would obtain RF and ccp ab       Zane Dickinson, PGY4  [phone]

## 2018-08-27 NOTE — PROGRESS NOTE ADULT - SUBJECTIVE AND OBJECTIVE BOX
CC: f/u for acute monoarticular rt knee arthritis    Patient reports: gradual improvement in knee pain    REVIEW OF SYSTEMS:  All other review of systems negative (Comprehensive ROS)    Antimicrobials Day #  :day 7  cefTRIAXone   IVPB 2 Gram(s) IV Intermittent every 24 hours  vancomycin  IVPB 1250 milliGRAM(s) IV Intermittent every 8 hours    Other Medications Reviewed    T(F): 97.7 (08-27-18 @ 14:20), Max: 98.3 (08-27-18 @ 05:10)  HR: 66 (08-27-18 @ 14:20)  BP: 124/77 (08-27-18 @ 14:20)  RR: 18 (08-27-18 @ 14:20)  SpO2: 98% (08-27-18 @ 14:20)  Wt(kg): --    PHYSICAL EXAM:  General: alert, no acute distress  Eyes:  anicteric, no conjunctival injection, no discharge  Oropharynx: no lesions or injection 	  Neck: supple, without adenopathy  Lungs: clear to auscultation  Heart: regular rate and rhythm; no murmur, rubs or gallops  Abdomen: soft, nondistended, nontender, without mass or organomegaly  Skin: no lesions  Extremities: no clubbing, cyanosis, or edema  Neurologic: alert, oriented, moves all extremities  Rt knee incision is C/D/I, no drainage or swelling  LAB RESULTS:  all cultures are negative            MICROBIOLOGY:  RECENT CULTURES:      RADIOLOGY REVIEWED:

## 2018-08-27 NOTE — PROGRESS NOTE ADULT - ASSESSMENT
Monoarticular arthritis  No positive culture, lyme serology is negative.  Possible culture negative septic arthritis.  Rheum evaluation is in progress  Unless rheum can come up with a viable diagnosis which is non infectious we will switch patient to doxycycline 100 BID and cefdinir 300 BID to complete additional 2 weeks of treatment and avoid a PICC line.

## 2018-08-28 ENCOUNTER — TRANSCRIPTION ENCOUNTER (OUTPATIENT)
Age: 33
End: 2018-08-28

## 2018-08-28 LAB
RHEUMATOID FACT SERPL-ACNC: <10 IU/ML — SIGNIFICANT CHANGE UP (ref 0–13)
VANCOMYCIN TROUGH SERPL-MCNC: 17.5 UG/ML — SIGNIFICANT CHANGE UP (ref 10–20)

## 2018-08-28 RX ORDER — ACETAMINOPHEN 500 MG
2 TABLET ORAL
Qty: 0 | Refills: 0 | COMMUNITY
Start: 2018-08-28

## 2018-08-28 RX ORDER — TRAMADOL HYDROCHLORIDE 50 MG/1
50 TABLET ORAL EVERY 6 HOURS
Qty: 0 | Refills: 0 | Status: DISCONTINUED | OUTPATIENT
Start: 2018-08-28 | End: 2018-08-29

## 2018-08-28 RX ORDER — SENNA PLUS 8.6 MG/1
2 TABLET ORAL
Qty: 0 | Refills: 0 | COMMUNITY
Start: 2018-08-28

## 2018-08-28 RX ADMIN — PANTOPRAZOLE SODIUM 40 MILLIGRAM(S): 20 TABLET, DELAYED RELEASE ORAL at 12:06

## 2018-08-28 RX ADMIN — Medication 100 MILLIGRAM(S): at 13:52

## 2018-08-28 RX ADMIN — TRAMADOL HYDROCHLORIDE 50 MILLIGRAM(S): 50 TABLET ORAL at 12:12

## 2018-08-28 RX ADMIN — Medication 325 MILLIGRAM(S): at 12:07

## 2018-08-28 RX ADMIN — Medication 166.67 MILLIGRAM(S): at 06:39

## 2018-08-28 RX ADMIN — TRAMADOL HYDROCHLORIDE 50 MILLIGRAM(S): 50 TABLET ORAL at 21:59

## 2018-08-28 RX ADMIN — Medication 166.67 MILLIGRAM(S): at 15:09

## 2018-08-28 RX ADMIN — CEFTRIAXONE 100 GRAM(S): 500 INJECTION, POWDER, FOR SOLUTION INTRAMUSCULAR; INTRAVENOUS at 17:36

## 2018-08-28 RX ADMIN — TRAMADOL HYDROCHLORIDE 50 MILLIGRAM(S): 50 TABLET ORAL at 05:43

## 2018-08-28 RX ADMIN — TRAMADOL HYDROCHLORIDE 50 MILLIGRAM(S): 50 TABLET ORAL at 12:43

## 2018-08-28 RX ADMIN — POLYETHYLENE GLYCOL 3350 17 GRAM(S): 17 POWDER, FOR SOLUTION ORAL at 12:07

## 2018-08-28 RX ADMIN — TRAMADOL HYDROCHLORIDE 50 MILLIGRAM(S): 50 TABLET ORAL at 21:29

## 2018-08-28 RX ADMIN — Medication 1 APPLICATION(S): at 17:36

## 2018-08-28 RX ADMIN — TRAMADOL HYDROCHLORIDE 50 MILLIGRAM(S): 50 TABLET ORAL at 05:13

## 2018-08-28 RX ADMIN — Medication 1 APPLICATION(S): at 05:14

## 2018-08-28 NOTE — PROGRESS NOTE ADULT - SUBJECTIVE AND OBJECTIVE BOX
CC: f/u for septic right knee    Patient reports feels well, knee better    REVIEW OF SYSTEMS:  All other review of systems negative (Comprehensive ROS)    Antimicrobials Day #  :8/21  cefTRIAXone   IVPB 2 Gram(s) IV Intermittent every 24 hours    Other Medications Reviewed    T(F): 97.8 (08-28-18 @ 16:20), Max: 98.4 (08-28-18 @ 00:26)  HR: 76 (08-28-18 @ 16:20)  BP: 127/82 (08-28-18 @ 16:20)  RR: 18 (08-28-18 @ 16:20)  SpO2: 99% (08-28-18 @ 16:20)  Wt(kg): --    PHYSICAL EXAM:  General: alert, no acute distress  Eyes:  anicteric, no conjunctival injection, no discharge  Oropharynx: no lesions or injection 	  Neck: supple, without adenopathy  Lungs: clear to auscultation  Heart: regular rate and rhythm; no murmur, rubs or gallops  Abdomen: soft, nondistended, nontender, without mass or organomegaly  Skin: no lesions  Extremities: no clubbing, cyanosis, or edema. knee wound clean, good rom  Neurologic: alert, oriented, moves all extremities    LAB RESULTS:    Rheumatoid Factor Quant, Serum or Plasma in AM (08.28.18 @ 09:11)    Rheumatoid Factor Quant, Serum or Plasma: <10: Test Repeated. IU/mL              MICROBIOLOGY:  RECENT CULTURES:    Culture - Tissue with Gram Stain (08.21.18 @ 07:36)    Gram Stain:   Moderate polymorphonuclear leukocytes seen per low power field  Numerous Red blood cells seen per low power field  No organisms seen per oil power field    Specimen Source: .Tissue Other, right knee synovium    Culture Results:   No growth at 5 days      RADIOLOGY REVIEWED:  < from: MR Knee w/wo IV Cont, Right (08.23.18 @ 21:43) >  IMPRESSION:   1.  Postsurgical changes of the quadriceps tendon with surrounding soft   tissue swelling and edema that may reflect postsurgical changes.   Superimposed infection is not excluded.  2.  Moderate mildly complex knee joint effusion with synovial   enhancement. The setting of recent surgery, differential includes   postsurgical changes and synovitis of infectious or inflammatory etiology.  3.  Nonspecific distal vastus lateralis and vastus medialis edema and   enhancement, likely compatible with postsurgical change. Differential   includes myositis of infectious or inflammatory etiology.  4.  Nondisplaced posterior horn tear in the medial meniscus  5.  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    < end of copied text >    < from: Transthoracic Echocardiogram (08.23.18 @ 14:03) >  Conclusions:  1. Normal mitral valve. Minimal mitral regurgitation.  2. Normal trileaflet aortic valve.  3. Increased relative wall thickness with normal left  ventricular mass index, consistent with concentric left  ventricular remodeling.  4. Normal left ventricular systolic function. No segmental  wall motion abnormalities.  5. Normal right ventricular size and function.  *** No previous Echo exam.  ------------------------------------------------------------------------    < end of copied text >            Assessment:  Patient with presumed culture negative septic right knee s/p washout. Rheum eval appreciated. patient refuses picc  Plan: continue ceftriaxone  will hold vanco since trough a bit high  can change to po doxycycline and cefdinir for discharge to complete 2 more weeks of antibiotics

## 2018-08-28 NOTE — DISCHARGE NOTE ADULT - NS AS ACTIVITY OBS
Walking-Outdoors allowed/Showering allowed/Stairs allowed/Walking-Indoors allowed/Do not make important decisions/Do not drive or operate machinery/stairs/shower with assistance/No Heavy lifting/straining

## 2018-08-28 NOTE — DISCHARGE NOTE ADULT - CARE PROVIDERS DIRECT ADDRESSES
,[email or web address],DirectAddress_Unknown,[email or web address]

## 2018-08-28 NOTE — PROGRESS NOTE ADULT - SUBJECTIVE AND OBJECTIVE BOX
Patient seen and examined. Pain controlled.    Physical exam  VS: Afebrile, vital signs stable  Gen: NAD  Right LE: +EHL/FHL/TA/GSC. SILT L3-S1. +Capillary refill brisk. Compartments soft and nontender.      32M s/p right knee I&D    Anticipate discharge today pending ID/rheum recommendations  Weight bear as tolerated  Pain control  DVT prophylaxis  Physical therapy

## 2018-08-28 NOTE — DISCHARGE NOTE ADULT - CARE PLAN
Principal Discharge DX:	Pyogenic arthritis of right knee joint, due to unspecified organism  Goal:	improved ADL's, pain control  Assessment and plan of treatment:	Please follow up with Dr. Ladd 7-10 days after your discharge from the hospital.  PT-weight bearing as tolerated.  Aspirin 325 twice daily x 6 weeks total for dvt prevention.  Keep site clean and intact, have doctor remove sutures post op day 14 and apply steristrips if applicable. Principal Discharge DX:	Pyogenic arthritis of right knee joint, due to unspecified organism  Goal:	improved ADL's, pain control  Assessment and plan of treatment:	Please follow up with Dr. Ladd 7-10 days after your discharge from the hospital.  PT-weight bearing as tolerated.  Aspirin 325 twice daily x 6 weeks total for dvt prevention.  Keep site clean and intact, have doctor remove sutures post op day 14 and apply steristrips if applicable.  Complete antibiotics per infectious disease.  Follow up in their office x2 weeks (call for appointment).  Follow up with Rheumatology as outpatient.

## 2018-08-28 NOTE — DISCHARGE NOTE ADULT - ADDITIONAL INSTRUCTIONS
Please follow up with Dr. Ladd and Dr. Badillo on discharge.  See your PCP within 1 month from hospital discharge for routine follow up.

## 2018-08-28 NOTE — DISCHARGE NOTE ADULT - HOSPITAL COURSE
This is a 32 year old male admitted to [facility] on 8/20/18 for a suspected R septic knee.  The patient underwent a washout of the knee which was unremarkable.  Infectious disease and Rheumatology were consulted, who recommended....   Remain of hospital stay was unremarkable.  Patient discharged home. This is a 32 year old male admitted to [facility] on 8/20/18 for a suspected R septic knee.  The patient underwent a washout of the knee which was unremarkable.  Infectious disease consulted who recommended antibiotics orally x 2 weeks.   Rheumatology was consulted, who recommended blood work which was negative.  Remain of hospital stay was unremarkable.  Patient cleared PT and was discharged home.

## 2018-08-28 NOTE — DISCHARGE NOTE ADULT - PROVIDER TOKENS
RADHA:'[id number]',RADHA:'[id number]' TOKVITOR:'[id number]',TOKVITOR:'[id number]',RADHA:'[id number]'

## 2018-08-28 NOTE — DISCHARGE NOTE ADULT - PATIENT PORTAL LINK FT
You can access the [facility] Patient Portal, offered by [facility] [place], by registering with the following website: [email or web address]

## 2018-08-28 NOTE — DISCHARGE NOTE ADULT - MEDICATION SUMMARY - MEDICATIONS TO TAKE
I will START or STAY ON the medications listed below when I get home from the hospital:    traMADol 50 mg oral tablet  -- 1 tab(s) by mouth every 6 hours, As needed, Mild Pain (1 - 3) MDD:4  -- Indication: For Pain    aspirin 325 mg oral delayed release tablet  -- 1 tab(s) by mouth once a day x 42 days for dvt prevention  -- Indication: For dvt prevention    acetaminophen 325 mg oral tablet  -- 2 tab(s) by mouth every 6 hours, As needed, For Temp over 38.3 C (100.94 F) or mild pain  -- Indication: For temp/pain    doxycycline hyclate 100 mg oral capsule  -- 1 cap(s) by mouth 2 times a day MDD:2  -- Avoid prolonged or excessive exposure to direct and/or artificial sunlight while taking this medication.  Do not take this drug if you are pregnant.  Finish all this medication unless otherwise directed by prescriber.  Medication should be taken with plenty of water.    -- Indication: For antibiotics    cefdinir 300 mg oral capsule  -- 1 cap(s) by mouth every 12 hours MDD:2  -- Finish all this medication unless otherwise directed by prescriber.    -- Indication: For antibiotic    senna oral tablet  -- 2 tab(s) by mouth once a day (at bedtime), As needed, Constipation  -- Indication: For laxative

## 2018-08-28 NOTE — DISCHARGE NOTE ADULT - CARE PROVIDER_API CALL
Conrado Ladd), Orthopedics  [address]  [address]  [place] [address]  Phone: [phone]  Fax: [phone]    Jovan Badillo), Internal Medicine  [address]  [address]  [place] [address]  Phone: [phone]  Fax: [phone] Conrado Ladd), Orthopedics  [address]  [address]  [place] [address]  Phone: [phone]  Fax: [phone]    Jovan Badillo), Internal Medicine  [address]  [place] [address]  Phone: [phone]  Fax: [phone]    Ping Bedoya), Rheumatology  [address]  [address]  [place] [address]  Phone: [phone]  Fax: [phone]

## 2018-08-28 NOTE — DISCHARGE NOTE ADULT - PLAN OF CARE
improved ADL's, pain control Please follow up with Dr. Ladd 7-10 days after your discharge from the hospital.  PT-weight bearing as tolerated.  Aspirin 325 twice daily x 6 weeks total for dvt prevention.  Keep site clean and intact, have doctor remove sutures post op day 14 and apply steristrips if applicable. Please follow up with Dr. Ladd 7-10 days after your discharge from the hospital.  PT-weight bearing as tolerated.  Aspirin 325 twice daily x 6 weeks total for dvt prevention.  Keep site clean and intact, have doctor remove sutures post op day 14 and apply steristrips if applicable.  Complete antibiotics per infectious disease.  Follow up in their office x2 weeks (call for appointment).  Follow up with Rheumatology as outpatient.

## 2018-08-29 VITALS
TEMPERATURE: 98 F | SYSTOLIC BLOOD PRESSURE: 118 MMHG | DIASTOLIC BLOOD PRESSURE: 70 MMHG | HEART RATE: 65 BPM | OXYGEN SATURATION: 96 % | RESPIRATION RATE: 16 BRPM

## 2018-08-29 PROCEDURE — 71045 X-RAY EXAM CHEST 1 VIEW: CPT

## 2018-08-29 PROCEDURE — 86140 C-REACTIVE PROTEIN: CPT

## 2018-08-29 PROCEDURE — 85730 THROMBOPLASTIN TIME PARTIAL: CPT

## 2018-08-29 PROCEDURE — 86618 LYME DISEASE ANTIBODY: CPT

## 2018-08-29 PROCEDURE — 20610 DRAIN/INJ JOINT/BURSA W/O US: CPT | Mod: RT

## 2018-08-29 PROCEDURE — 87040 BLOOD CULTURE FOR BACTERIA: CPT

## 2018-08-29 PROCEDURE — 86431 RHEUMATOID FACTOR QUANT: CPT

## 2018-08-29 PROCEDURE — 85652 RBC SED RATE AUTOMATED: CPT

## 2018-08-29 PROCEDURE — 73723 MRI JOINT LWR EXTR W/O&W/DYE: CPT

## 2018-08-29 PROCEDURE — 87070 CULTURE OTHR SPECIMN AEROBIC: CPT

## 2018-08-29 PROCEDURE — 80053 COMPREHEN METABOLIC PANEL: CPT

## 2018-08-29 PROCEDURE — 86900 BLOOD TYPING SEROLOGIC ABO: CPT

## 2018-08-29 PROCEDURE — 99285 EMERGENCY DEPT VISIT HI MDM: CPT | Mod: 25

## 2018-08-29 PROCEDURE — 87591 N.GONORRHOEAE DNA AMP PROB: CPT

## 2018-08-29 PROCEDURE — 85610 PROTHROMBIN TIME: CPT

## 2018-08-29 PROCEDURE — 80048 BASIC METABOLIC PNL TOTAL CA: CPT

## 2018-08-29 PROCEDURE — 84157 ASSAY OF PROTEIN OTHER: CPT

## 2018-08-29 PROCEDURE — 89060 EXAM SYNOVIAL FLUID CRYSTALS: CPT

## 2018-08-29 PROCEDURE — 86901 BLOOD TYPING SEROLOGIC RH(D): CPT

## 2018-08-29 PROCEDURE — 86850 RBC ANTIBODY SCREEN: CPT

## 2018-08-29 PROCEDURE — 85027 COMPLETE CBC AUTOMATED: CPT

## 2018-08-29 PROCEDURE — 97161 PT EVAL LOW COMPLEX 20 MIN: CPT

## 2018-08-29 PROCEDURE — 87075 CULTR BACTERIA EXCEPT BLOOD: CPT

## 2018-08-29 PROCEDURE — 80202 ASSAY OF VANCOMYCIN: CPT

## 2018-08-29 PROCEDURE — 87491 CHLMYD TRACH DNA AMP PROBE: CPT

## 2018-08-29 PROCEDURE — 89051 BODY FLUID CELL COUNT: CPT

## 2018-08-29 PROCEDURE — 73562 X-RAY EXAM OF KNEE 3: CPT

## 2018-08-29 PROCEDURE — 82945 GLUCOSE OTHER FLUID: CPT

## 2018-08-29 PROCEDURE — 87205 SMEAR GRAM STAIN: CPT

## 2018-08-29 PROCEDURE — 93005 ELECTROCARDIOGRAM TRACING: CPT

## 2018-08-29 PROCEDURE — 93306 TTE W/DOPPLER COMPLETE: CPT

## 2018-08-29 PROCEDURE — A9585: CPT

## 2018-08-29 PROCEDURE — 96374 THER/PROPH/DIAG INJ IV PUSH: CPT | Mod: XU

## 2018-08-29 RX ORDER — TRAMADOL HYDROCHLORIDE 50 MG/1
1 TABLET ORAL
Qty: 28 | Refills: 0 | OUTPATIENT
Start: 2018-08-29

## 2018-08-29 RX ORDER — CEFDINIR 250 MG/5ML
1 POWDER, FOR SUSPENSION ORAL
Qty: 28 | Refills: 0 | OUTPATIENT
Start: 2018-08-29 | End: 2018-09-11

## 2018-08-29 RX ORDER — ASPIRIN/CALCIUM CARB/MAGNESIUM 324 MG
1 TABLET ORAL
Qty: 42 | Refills: 0 | OUTPATIENT
Start: 2018-08-29 | End: 2018-10-09

## 2018-08-29 RX ADMIN — PANTOPRAZOLE SODIUM 40 MILLIGRAM(S): 20 TABLET, DELAYED RELEASE ORAL at 12:05

## 2018-08-29 RX ADMIN — TRAMADOL HYDROCHLORIDE 50 MILLIGRAM(S): 50 TABLET ORAL at 07:15

## 2018-08-29 RX ADMIN — TRAMADOL HYDROCHLORIDE 50 MILLIGRAM(S): 50 TABLET ORAL at 06:45

## 2018-08-29 RX ADMIN — Medication 325 MILLIGRAM(S): at 12:05

## 2018-08-29 RX ADMIN — Medication 1 APPLICATION(S): at 06:45

## 2018-08-29 NOTE — PROGRESS NOTE ADULT - SUBJECTIVE AND OBJECTIVE BOX
Patient seen and examined. Pain controlled. No acute events overnight.     MEDICATIONS  (STANDING):  aspirin enteric coated 325 milliGRAM(s) Oral daily  cefTRIAXone   IVPB 2 Gram(s) IV Intermittent every 24 hours  docusate sodium 100 milliGRAM(s) Oral three times a day  hydrocortisone 1% Cream 1 Application(s) Topical two times a day  lactated ringers. 1000 milliLiter(s) IV Continuous <Continuous>  pantoprazole    Tablet 40 milliGRAM(s) Oral daily  polyethylene glycol 3350 17 Gram(s) Oral daily    Allergies    No Known Allergies    Intolerances                Vital Signs Last 24 Hrs  T(C): 36.8 (08-29-18 @ 04:20), Max: 36.8 (08-29-18 @ 00:20)  T(F): 98.2 (08-29-18 @ 04:20), Max: 98.3 (08-29-18 @ 00:20)  HR: 66 (08-29-18 @ 04:20) (65 - 76)  BP: 119/73 (08-29-18 @ 04:20) (117/77 - 132/85)  BP(mean): --  RR: 18 (08-29-18 @ 04:20) (18 - 18)  SpO2: 96% (08-29-18 @ 04:20) (96% - 99%)    Physical Exam  Gen: NAD  RLE:   incision c/d/i  +ehl/fhl/ta/gs function  L2-S1 silt  Dp/pt pulse intact  No calf ttp  Compartments soft    A/P: 32y Male sp R knee I+D  Pain control  DVT ppx, A qd  PT/WBAT/OOB  Ice/elevate  Medical management appreciated  Incentive spirometry  Dispo planning, home  po doxycycline and cefdinir for discharge to complete 2 more weeks of antibiotics per ID

## 2018-08-29 NOTE — PROGRESS NOTE ADULT - PROVIDER SPECIALTY LIST ADULT
Infectious Disease
Orthopedics
Infectious Disease
Infectious Disease
Orthopedics

## 2018-08-29 NOTE — PROGRESS NOTE ADULT - SUBJECTIVE AND OBJECTIVE BOX
CC: f/u for septic right knee    Patient reports still feeling well, minimal knee pain    REVIEW OF SYSTEMS:  All other review of systems negative (Comprehensive ROS)    Antimicrobials Day # 9/21  Other Medications Reviewed    Vital Signs Last 24 Hrs  T(F): 98.3 (29 Aug 2018 13:26), Max: 98.3 (29 Aug 2018 00:20)  HR: 65 (29 Aug 2018 13:26) (65 - 76)  BP: 118/70 (29 Aug 2018 13:26) (116/72 - 132/85)  BP(mean): --  RR: 16 (29 Aug 2018 13:26) (16 - 18)  SpO2: 96% (29 Aug 2018 13:26) (96% - 99%)    PHYSICAL EXAM:  General: alert, no acute distress  Eyes:  anicteric, no conjunctival injection, no discharge  Oropharynx: no lesions or injection 	  Neck: supple, without adenopathy  Lungs: clear to auscultation  Heart: regular rate and rhythm; no murmur, rubs or gallops  Abdomen: soft, nondistended, nontender, without mass or organomegaly  Skin: no lesions  Extremities: R knee incision clean/intact  Neurologic: alert, oriented, moves all extremities    LAB RESULTS:  Rheumatoid Factor Quant, Serum or Plasma in AM (08.28.18 @ 09:11)    Rheumatoid Factor Quant, Serum or Plasma: <10: Test Repeated. IU/mL    Borrelia burgdorferi IgG/IgM Antibodies (08.21.18 @ 14:22)    LYME IgG/IgM Antibodies Result: 0.31 Index    Lyme C6 Interpretation: Negative    Chlamydia/GC Nucleic Acid Amplification (08.21.18 @ 14:21)    Source Amp: Urine: Not Detected    MICROBIOLOGY:  RECENT CULTURES:  Culture - Tissue with Gram Stain (08.21.18 @ 07:36)    Gram Stain:   Moderate polymorphonuclear leukocytes seen per low power field  Numerous Red blood cells seen per low power field  No organisms seen per oil power field    Specimen Source: .Tissue Other, right knee synovium    Culture Results:   No growth at 5 days      RADIOLOGY REVIEWED:  MR Knee w/wo IV Cont, Right (08.23.18 @ 21:43) >  IMPRESSION:   1.  Postsurgical changes of the quadriceps tendon with surrounding soft   tissue swelling and edema that may reflect postsurgical changes.   Superimposed infection is not excluded.  2.  Moderate mildly complex knee joint effusion with synovial   enhancement. The setting of recent surgery, differential includes   postsurgical changes and synovitis of infectious or inflammatory etiology.  3.  Nonspecific distal vastus lateralis and vastus medialis edema and   enhancement, likely compatible with postsurgical change. Differential   includes myositis of infectious or inflammatory etiology.  4.  Nondisplaced posterior horn tear in the medial meniscus  5.  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    Transthoracic Echocardiogram (08.23.18 @ 14:03) >  Conclusions:  1. Normal mitral valve. Minimal mitral regurgitation.  2. Normal trileaflet aortic valve.  3. Increased relative wall thickness with normal left  ventricular mass index, consistent with concentric left  ventricular remodeling.  4. Normal left ventricular systolic function. No segmental  wall motion abnormalities.  5. Normal right ventricular size and function.  *** No previous Echo exam.  ------------------------------------------------------------------------

## 2018-08-29 NOTE — PROGRESS NOTE ADULT - ASSESSMENT
Patient with culture negative septic right knee s/p washout.   Cxs negative, Lyme neg, Urine GC neg  Rheum eval noted  Patient refused picc  Afebrile, R knee doing well- no signs of infection    Plan:   Change to po doxycycline and cefdinir for discharge- 2 more weeks of antibiotics  D/w pt and family  D/w PA

## 2018-09-05 PROBLEM — Z00.00 ENCOUNTER FOR PREVENTIVE HEALTH EXAMINATION: Status: ACTIVE | Noted: 2018-09-05

## 2018-09-10 ENCOUNTER — APPOINTMENT (OUTPATIENT)
Dept: ORTHOPEDIC SURGERY | Facility: CLINIC | Age: 33
End: 2018-09-10
Payer: COMMERCIAL

## 2018-09-10 PROCEDURE — 99024 POSTOP FOLLOW-UP VISIT: CPT

## 2018-09-10 PROCEDURE — 73562 X-RAY EXAM OF KNEE 3: CPT | Mod: RT

## 2018-10-29 ENCOUNTER — APPOINTMENT (OUTPATIENT)
Dept: ORTHOPEDIC SURGERY | Facility: CLINIC | Age: 33
End: 2018-10-29
Payer: COMMERCIAL

## 2018-10-29 VITALS — WEIGHT: 165 LBS | HEIGHT: 66 IN | BODY MASS INDEX: 26.52 KG/M2

## 2018-10-29 DIAGNOSIS — M00.9 PYOGENIC ARTHRITIS, UNSPECIFIED: ICD-10-CM

## 2018-10-29 PROCEDURE — 73564 X-RAY EXAM KNEE 4 OR MORE: CPT | Mod: RT

## 2018-10-29 PROCEDURE — 99024 POSTOP FOLLOW-UP VISIT: CPT

## 2019-02-11 ENCOUNTER — APPOINTMENT (OUTPATIENT)
Dept: ORTHOPEDIC SURGERY | Facility: CLINIC | Age: 34
End: 2019-02-11

## 2020-12-14 NOTE — PHYSICAL THERAPY INITIAL EVALUATION ADULT - DIAGNOSIS, PT EVAL
Pt with decreased strength, endurance and balance leading to mild impairment in functional mobility. Retention Suture Bite Size: 3 mm

## 2021-01-25 DIAGNOSIS — Z86.718 PERSONAL HISTORY OF OTHER VENOUS THROMBOSIS AND EMBOLISM: ICD-10-CM

## 2021-01-25 DIAGNOSIS — E78.5 HYPERLIPIDEMIA, UNSPECIFIED: ICD-10-CM

## 2021-01-27 ENCOUNTER — APPOINTMENT (OUTPATIENT)
Dept: CARDIOLOGY | Facility: CLINIC | Age: 36
End: 2021-01-27
Payer: COMMERCIAL

## 2021-01-27 ENCOUNTER — NON-APPOINTMENT (OUTPATIENT)
Age: 36
End: 2021-01-27

## 2021-01-27 VITALS
HEART RATE: 62 BPM | SYSTOLIC BLOOD PRESSURE: 136 MMHG | WEIGHT: 174 LBS | HEIGHT: 66 IN | BODY MASS INDEX: 27.97 KG/M2 | OXYGEN SATURATION: 100 % | DIASTOLIC BLOOD PRESSURE: 87 MMHG

## 2021-01-27 DIAGNOSIS — I10 ESSENTIAL (PRIMARY) HYPERTENSION: ICD-10-CM

## 2021-01-27 DIAGNOSIS — Z86.79 PERSONAL HISTORY OF OTHER DISEASES OF THE CIRCULATORY SYSTEM: ICD-10-CM

## 2021-01-27 DIAGNOSIS — Z78.9 OTHER SPECIFIED HEALTH STATUS: ICD-10-CM

## 2021-01-27 DIAGNOSIS — I25.10 ATHEROSCLEROTIC HEART DISEASE OF NATIVE CORONARY ARTERY W/OUT ANGINA PECTORIS: ICD-10-CM

## 2021-01-27 DIAGNOSIS — R07.9 CHEST PAIN, UNSPECIFIED: ICD-10-CM

## 2021-01-27 DIAGNOSIS — Z86.39 PERSONAL HISTORY OF OTHER ENDOCRINE, NUTRITIONAL AND METABOLIC DISEASE: ICD-10-CM

## 2021-01-27 PROCEDURE — 93000 ELECTROCARDIOGRAM COMPLETE: CPT

## 2021-01-27 PROCEDURE — 99204 OFFICE O/P NEW MOD 45 MIN: CPT

## 2021-01-27 PROCEDURE — 99072 ADDL SUPL MATRL&STAF TM PHE: CPT

## 2021-01-27 NOTE — HISTORY OF PRESENT ILLNESS
[FreeTextEntry1] : Delon is a 35 year old male here for evaluation.\par He has no significant past medical history. He took no medications prior to this month. He has no family history of CAD and denies toxic habits. \par He presented to [facility] on 1/15/21 with chest pain. He eventually had a cardiac cath which demonstrated a 50% stenosis of one of his arteries. The records are not available. He reports a normal echocardiogram.\par He reports being sent home on ASA, a statin and a BP medication.\par \par He is feeling well overall. He has no chest pain, dyspnea or palpitations. He reports compliance with his medications though wants to stop some.\par

## 2021-01-27 NOTE — DISCUSSION/SUMMARY
[FreeTextEntry1] : Delon recently presented with chest pain and had a cardiac cath that did not require intervention. His BP is near goal and his physical exam is unremarkable. His EKG demonstrates a SR with non-specific St-t abnormality. He also reports a recent echocardiogram. He cannot remember the names of his medications.\par I have requested a copy of his most recent records from [facility]. He needs to remain on an asa and statin, along with his blood pressure medication. I have stressed the importance of diet and exercise to reduce his overall CV risk. We will speak after we obtain his records to arrange follow up.

## 2021-01-27 NOTE — PHYSICAL EXAM
[General Appearance - Well Developed] : well developed [Normal Appearance] : normal appearance [Well Groomed] : well groomed [General Appearance - Well Nourished] : well nourished [No Deformities] : no deformities [General Appearance - In No Acute Distress] : no acute distress [Normal Conjunctiva] : the conjunctiva exhibited no abnormalities [Eyelids - No Xanthelasma] : the eyelids demonstrated no xanthelasmas [Normal Oral Mucosa] : normal oral mucosa [No Oral Pallor] : no oral pallor [No Oral Cyanosis] : no oral cyanosis [Normal Jugular Venous A Waves Present] : normal jugular venous A waves present [Normal Jugular Venous V Waves Present] : normal jugular venous V waves present [No Jugular Venous Moise A Waves] : no jugular venous moise A waves [Normal Rate] : normal [Normal S1] : normal S1 [Normal S2] : normal S2 [S3] : no S3 [S4] : no S4 [No Murmur] : no murmurs heard [Right Carotid Bruit] : no bruit heard over the right carotid [Left Carotid Bruit] : no bruit heard over the left carotid [Right Femoral Bruit] : no bruit heard over the right femoral artery [Left Femoral Bruit] : no bruit heard over the left femoral artery [2+] : left 2+ [No Abnormalities] : the abdominal aorta was not enlarged and no bruit was heard [No Pitting Edema] : no pitting edema present [Respiration, Rhythm And Depth] : normal respiratory rhythm and effort [Exaggerated Use Of Accessory Muscles For Inspiration] : no accessory muscle use [Auscultation Breath Sounds / Voice Sounds] : lungs were clear to auscultation bilaterally [Abdomen Soft] : soft [Abdomen Tenderness] : non-tender [Abdomen Mass (___ Cm)] : no abdominal mass palpated [Abnormal Walk] : normal gait [Gait - Sufficient For Exercise Testing] : the gait was sufficient for exercise testing [Nail Clubbing] : no clubbing of the fingernails [Cyanosis, Localized] : no localized cyanosis [Petechial Hemorrhages (___cm)] : no petechial hemorrhages [Skin Color & Pigmentation] : normal skin color and pigmentation [] : no rash [No Venous Stasis] : no venous stasis [Skin Lesions] : no skin lesions [No Skin Ulcers] : no skin ulcer [No Xanthoma] : no  xanthoma was observed [Oriented To Time, Place, And Person] : oriented to person, place, and time [Affect] : the affect was normal [Mood] : the mood was normal [No Anxiety] : not feeling anxious

## 2023-07-21 NOTE — PATIENT PROFILE ADULT. - NS PRO ABUSE SCREEN AFRAID ANYONE YN
Left thumb laceration, cut with a  about 30 min PTA while at work, bleeding controlled at this time   no

## 2024-12-23 ENCOUNTER — NON-APPOINTMENT (OUTPATIENT)
Age: 39
End: 2024-12-23